# Patient Record
Sex: FEMALE | Race: BLACK OR AFRICAN AMERICAN | NOT HISPANIC OR LATINO | URBAN - METROPOLITAN AREA
[De-identification: names, ages, dates, MRNs, and addresses within clinical notes are randomized per-mention and may not be internally consistent; named-entity substitution may affect disease eponyms.]

---

## 2021-12-17 ENCOUNTER — INPATIENT (INPATIENT)
Facility: HOSPITAL | Age: 36
LOS: 2 days | Discharge: HOME | DRG: 552 | End: 2021-12-20
Attending: HOSPITALIST | Admitting: HOSPITALIST
Payer: COMMERCIAL

## 2021-12-17 VITALS
SYSTOLIC BLOOD PRESSURE: 105 MMHG | OXYGEN SATURATION: 100 % | TEMPERATURE: 98 F | HEIGHT: 63 IN | RESPIRATION RATE: 18 BRPM | DIASTOLIC BLOOD PRESSURE: 68 MMHG | WEIGHT: 171.96 LBS | HEART RATE: 75 BPM

## 2021-12-17 LAB
ALBUMIN SERPL ELPH-MCNC: 3.1 G/DL — LOW (ref 3.5–5)
ALP SERPL-CCNC: 72 U/L — SIGNIFICANT CHANGE UP (ref 40–120)
ALT FLD-CCNC: 11 U/L DA — SIGNIFICANT CHANGE UP (ref 10–60)
ANION GAP SERPL CALC-SCNC: 3 MMOL/L — LOW (ref 5–17)
APPEARANCE UR: CLEAR — SIGNIFICANT CHANGE UP
APTT BLD: 30.9 SEC — SIGNIFICANT CHANGE UP (ref 27.5–35.5)
AST SERPL-CCNC: 12 U/L — SIGNIFICANT CHANGE UP (ref 10–40)
BACTERIA # UR AUTO: ABNORMAL /HPF
BASOPHILS # BLD AUTO: 0.03 K/UL — SIGNIFICANT CHANGE UP (ref 0–0.2)
BASOPHILS NFR BLD AUTO: 0.4 % — SIGNIFICANT CHANGE UP (ref 0–2)
BILIRUB SERPL-MCNC: 0.4 MG/DL — SIGNIFICANT CHANGE UP (ref 0.2–1.2)
BILIRUB UR-MCNC: NEGATIVE — SIGNIFICANT CHANGE UP
BUN SERPL-MCNC: 8 MG/DL — SIGNIFICANT CHANGE UP (ref 7–18)
CALCIUM SERPL-MCNC: 8.7 MG/DL — SIGNIFICANT CHANGE UP (ref 8.4–10.5)
CHLORIDE SERPL-SCNC: 108 MMOL/L — SIGNIFICANT CHANGE UP (ref 96–108)
CK SERPL-CCNC: 156 U/L — SIGNIFICANT CHANGE UP (ref 21–215)
CO2 SERPL-SCNC: 29 MMOL/L — SIGNIFICANT CHANGE UP (ref 22–31)
COLOR SPEC: YELLOW — SIGNIFICANT CHANGE UP
CREAT SERPL-MCNC: 0.51 MG/DL — SIGNIFICANT CHANGE UP (ref 0.5–1.3)
DIFF PNL FLD: NEGATIVE — SIGNIFICANT CHANGE UP
EOSINOPHIL # BLD AUTO: 0.17 K/UL — SIGNIFICANT CHANGE UP (ref 0–0.5)
EOSINOPHIL NFR BLD AUTO: 2.4 % — SIGNIFICANT CHANGE UP (ref 0–6)
EPI CELLS # UR: SIGNIFICANT CHANGE UP /HPF
ERYTHROCYTE [SEDIMENTATION RATE] IN BLOOD: 28 MM/HR — HIGH (ref 0–15)
GLUCOSE SERPL-MCNC: 83 MG/DL — SIGNIFICANT CHANGE UP (ref 70–99)
GLUCOSE UR QL: NEGATIVE — SIGNIFICANT CHANGE UP
HCG SERPL-ACNC: <1 MIU/ML — SIGNIFICANT CHANGE UP
HCT VFR BLD CALC: 31.2 % — LOW (ref 34.5–45)
HGB BLD-MCNC: 10.1 G/DL — LOW (ref 11.5–15.5)
IMM GRANULOCYTES NFR BLD AUTO: 0.1 % — SIGNIFICANT CHANGE UP (ref 0–1.5)
INR BLD: 1.14 RATIO — SIGNIFICANT CHANGE UP (ref 0.88–1.16)
KETONES UR-MCNC: ABNORMAL
LEUKOCYTE ESTERASE UR-ACNC: NEGATIVE — SIGNIFICANT CHANGE UP
LYMPHOCYTES # BLD AUTO: 3.32 K/UL — HIGH (ref 1–3.3)
LYMPHOCYTES # BLD AUTO: 46.4 % — HIGH (ref 13–44)
MAGNESIUM SERPL-MCNC: 2.2 MG/DL — SIGNIFICANT CHANGE UP (ref 1.6–2.6)
MCHC RBC-ENTMCNC: 28.8 PG — SIGNIFICANT CHANGE UP (ref 27–34)
MCHC RBC-ENTMCNC: 32.4 GM/DL — SIGNIFICANT CHANGE UP (ref 32–36)
MCV RBC AUTO: 88.9 FL — SIGNIFICANT CHANGE UP (ref 80–100)
MONOCYTES # BLD AUTO: 0.43 K/UL — SIGNIFICANT CHANGE UP (ref 0–0.9)
MONOCYTES NFR BLD AUTO: 6 % — SIGNIFICANT CHANGE UP (ref 2–14)
NEUTROPHILS # BLD AUTO: 3.2 K/UL — SIGNIFICANT CHANGE UP (ref 1.8–7.4)
NEUTROPHILS NFR BLD AUTO: 44.7 % — SIGNIFICANT CHANGE UP (ref 43–77)
NITRITE UR-MCNC: NEGATIVE — SIGNIFICANT CHANGE UP
NRBC # BLD: 0 /100 WBCS — SIGNIFICANT CHANGE UP (ref 0–0)
PH UR: 6.5 — SIGNIFICANT CHANGE UP (ref 5–8)
PLATELET # BLD AUTO: 280 K/UL — SIGNIFICANT CHANGE UP (ref 150–400)
POTASSIUM SERPL-MCNC: 3.4 MMOL/L — LOW (ref 3.5–5.3)
POTASSIUM SERPL-SCNC: 3.4 MMOL/L — LOW (ref 3.5–5.3)
PROT SERPL-MCNC: 7.4 G/DL — SIGNIFICANT CHANGE UP (ref 6–8.3)
PROT UR-MCNC: 15
PROTHROM AB SERPL-ACNC: 13.5 SEC — SIGNIFICANT CHANGE UP (ref 10.6–13.6)
RBC # BLD: 3.51 M/UL — LOW (ref 3.8–5.2)
RBC # FLD: 13.6 % — SIGNIFICANT CHANGE UP (ref 10.3–14.5)
RBC CASTS # UR COMP ASSIST: SIGNIFICANT CHANGE UP /HPF (ref 0–2)
SARS-COV-2 RNA SPEC QL NAA+PROBE: SIGNIFICANT CHANGE UP
SODIUM SERPL-SCNC: 140 MMOL/L — SIGNIFICANT CHANGE UP (ref 135–145)
SP GR SPEC: 1.01 — SIGNIFICANT CHANGE UP (ref 1.01–1.02)
UROBILINOGEN FLD QL: NEGATIVE — SIGNIFICANT CHANGE UP
WBC # BLD: 7.16 K/UL — SIGNIFICANT CHANGE UP (ref 3.8–10.5)
WBC # FLD AUTO: 7.16 K/UL — SIGNIFICANT CHANGE UP (ref 3.8–10.5)
WBC UR QL: SIGNIFICANT CHANGE UP /HPF (ref 0–5)

## 2021-12-17 PROCEDURE — 99285 EMERGENCY DEPT VISIT HI MDM: CPT

## 2021-12-17 RX ORDER — MORPHINE SULFATE 50 MG/1
4 CAPSULE, EXTENDED RELEASE ORAL ONCE
Refills: 0 | Status: DISCONTINUED | OUTPATIENT
Start: 2021-12-17 | End: 2021-12-17

## 2021-12-17 RX ORDER — ONDANSETRON 8 MG/1
4 TABLET, FILM COATED ORAL ONCE
Refills: 0 | Status: COMPLETED | OUTPATIENT
Start: 2021-12-17 | End: 2021-12-17

## 2021-12-17 RX ORDER — POTASSIUM CHLORIDE 20 MEQ
40 PACKET (EA) ORAL ONCE
Refills: 0 | Status: COMPLETED | OUTPATIENT
Start: 2021-12-17 | End: 2021-12-17

## 2021-12-17 RX ORDER — KETOROLAC TROMETHAMINE 30 MG/ML
30 SYRINGE (ML) INJECTION ONCE
Refills: 0 | Status: DISCONTINUED | OUTPATIENT
Start: 2021-12-17 | End: 2021-12-17

## 2021-12-17 RX ADMIN — ONDANSETRON 4 MILLIGRAM(S): 8 TABLET, FILM COATED ORAL at 21:27

## 2021-12-17 RX ADMIN — Medication 30 MILLIGRAM(S): at 19:57

## 2021-12-17 RX ADMIN — Medication 30 MILLIGRAM(S): at 22:42

## 2021-12-17 RX ADMIN — MORPHINE SULFATE 4 MILLIGRAM(S): 50 CAPSULE, EXTENDED RELEASE ORAL at 21:00

## 2021-12-17 RX ADMIN — MORPHINE SULFATE 4 MILLIGRAM(S): 50 CAPSULE, EXTENDED RELEASE ORAL at 21:27

## 2021-12-17 NOTE — ED PROVIDER NOTE - NEUROLOGICAL, MLM
Alert and oriented, no focal deficits, no motor or sensory deficits. Alert and oriented, sensory-intact, reflex-2+

## 2021-12-17 NOTE — ED PROVIDER NOTE - MUSCULOSKELETAL, MLM
Spine appears normal, range of motion is not limited, no muscle or joint tenderness Spine appears normal, range of motion is limited, pt will not sit up, mid lower back- tenderness to palp., straight leg 15 deg kh[place]., no CVAT

## 2021-12-17 NOTE — ED ADULT NURSE NOTE - OBJECTIVE STATEMENT
Pt arrived to ED c/o worsening lower back pain, unable to stand or sit, unable to walk due to severe pain, weakness and numbness  Pt was seen and evaluated by neurologist

## 2021-12-17 NOTE — ED PROVIDER NOTE - CARE PLAN
1 Principal Discharge DX:	Intractable back pain  Secondary Diagnosis:	Unable to ambulate  Secondary Diagnosis:	Hypokalemia

## 2021-12-17 NOTE — ED PROVIDER NOTE - CLINICAL SUMMARY MEDICAL DECISION MAKING FREE TEXT BOX
Patient with a history of lower chronic back pain and now sudden onset of back pain. Patient is unable to ambulate. Will get labs and CT. Will call neuro and have them see patient. Patient with a history of lower chronic back pain and now sudden onset of back pain. Patient is unable to ambulate, concern for transverse myelitis . Will get labs and CT. Will call neuro and have them see patient.

## 2021-12-17 NOTE — ED ADULT TRIAGE NOTE - CHIEF COMPLAINT QUOTE
BIBA, chronic back pain, got worsening at this time, numbness and tingling to b/l extremities upper and lower, unable to stand at this time.

## 2021-12-17 NOTE — CONSULT NOTE ADULT - ASSESSMENT
Acute recurrent episode of (probably lumbar/ doubt thoracic) disc disease.  Could be an annulus fibrosis tear (with bulge, without extrsuion).  Could be elena herniation (extruded disc material).      No demonstrable motor or sensory deficits (proximal LE motor exam limited by pain).  No "root signature."       RECOMMENDATIONS          Acute recurrent episode of (probably lumbar/ doubt thoracic) disc disease.  Could be an annulus fibrosis tear (with bulge, without extrsuion).  Could be elena herniation (extruded disc material).      No demonstrable motor or sensory deficits (proximal LE motor exam limited by pain).  No "root signature."       RECOMMENDATIONS         Non-con MRs thoracic and lumbar spine.    Roentgenograms and CTs would not be definitive for disc imaging and would therefore subject of woman of child-bearing potential to unnecessary exposure of gonads to radiation.    Ketorolac 30mg IV Q 6 hrs PRN.  Opioid "rescue" as needed.      ESR, CRP, RF.   Acute recurrent episode of (probably lumbar/ doubt thoracic) disc disease.  Could be an annulus fibrosis tear (with bulge, without extrsuion).  Could be elena herniation (extruded disc material).      No demonstrable motor or sensory deficits (proximal LE motor exam limited by pain).  No "root signature."       RECOMMENDATIONS         Hospital admission for pain control and spine imaging.      Non-con MRs thoracic and lumbar spine.    Roentgenograms and CTs would not be definitive for disc imaging and would therefore subject of woman of child-bearing potential to unnecessary exposure of gonads to radiation.    Ketorolac 30mg IV Q 6 hrs PRN.  Opioid "rescue" as needed.      ESR, CRP, RF.

## 2021-12-17 NOTE — ED PROVIDER NOTE - PROGRESS NOTE DETAILS
seen by neuro, advised no CT.  Advised to give meds & observe.  Pt cont. to c/o back pain, unable to get up & ambulate, will admit pt

## 2021-12-17 NOTE — ED ADULT NURSE NOTE - NSIMPLEMENTINTERV_GEN_ALL_ED
Implemented All Fall Risk Interventions:  [place] to call system. Call bell, personal items and telephone within reach. Instruct patient to call for assistance. Room bathroom lighting operational. Non-slip footwear when patient is off stretcher. Physically safe environment: no spills, clutter or unnecessary equipment. Stretcher in lowest position, wheels locked, appropriate side rails in place. Provide visual cue, wrist band, yellow gown, etc. Monitor gait and stability. Monitor for mental status changes and reorient to person, place, and time. Review medications for side effects contributing to fall risk. Reinforce activity limits and safety measures with patient and family.

## 2021-12-17 NOTE — ED ADULT NURSE NOTE - CHPI ED NUR SYMPTOMS NEG
no anorexia/no bladder dysfunction/no bowel dysfunction/no constipation/no fatigue/no motor function loss/no neck tenderness

## 2021-12-17 NOTE — ED PROVIDER NOTE - WET READ LAUNCH FT
Message   Recorded as Task   Date: 11/26/2018 12:37 PM, Created By: MONICA ROLON   Task Name: Call Patient with results   Assigned To: Mandie Winston   Regarding Patient: DEBBIE CLAY, Status: Active   Comment:    MONICA ROLON - 26 Nov 2018 12:37 PM     Patient Phone: [phone]      inform of benign cervica bx. there is some suggestion of chronic inflammation which may be from infection. I woul d like her to take a one time dose of azithromycin just in case. rx is sent.   repeat pap in one year.   Indira Levy - 26 Nov 2018 2:25 PM     TASK REASSIGNED: Previously Assigned To MONICA ROLON Sara - 26 Nov 2018 4:35 PM     TASK EDITED  Spoke with patient informer her of result.  She is aware to take antibiotic due to inflammation.    She is aware to have repeat pap smear in 1 year.        Plan   1. Azithromycin 500 MG Oral Tablet; TAKE 2 TABLETS ONCE AS DIRECTED    Signatures   Electronically signed by : Mandie Winston R.N.; Nov 26 2018  4:35PM CST    
There are no Wet Read(s) to document.

## 2021-12-17 NOTE — CONSULT NOTE ADULT - SUBJECTIVE AND OBJECTIVE BOX
History from ED Provider Note    <Start of quote(s) from ED Provider Note>  "· Chief Complaint: The patient is a 36y Female complaining of back pain/injury.  · HPI Objective Statement: 36 year old female with no PHMx and no past surgeries and an LMP of 12/3/2021 presents to the ED brought in by EMS with complaints of constant mid-lower back pain, throbbing pain radiating to the back left leg with numbness and tingling to bilateral legs, arms, hands and fingers and sciatica down the left leg. Patient states she woke up fine but at 8:30 she started experiencing symptoms. States the pain has since been constant and was unable to walk. Denies taking any medications for the pain. States she had similar symptoms of back pain last month but was able to walk and symptoms resolved. States the symptoms came back today but was not ambulatory and came to the ER via EMS. Reports she has not seen a neurologist but had a MRI of the back in 2012 for back pain too. Denies fever and other injuries. Endorses having received both doses of COVID-19 vaccine.   Allergies: Tylenol, Codeine  · Presenting Symptoms: BACK PAIN, NUMBNESS, TINGLING  · Negative Findings: no fever  · Location: back  · Area: lower  mid  · Radiation: leg  · Timing: sudden onset, constant  · Duration: today  · Quality: throbbing   . . .   PAST MEDICAL/SURGICAL/FAMILY/SOCIAL HISTORY:    Past Medical, Past Surgical, and Family History:  PAST MEDICAL HISTORY:  No pertinent past medical history.     PAST SURGICAL HISTORY:  No significant past surgical history.     Tobacco Usage:  · Tobacco Usage	Never smoker   . . .        Allergies:  	codeine: Drug, Hives    Home Medications:   * Patient Currently Takes Medications as of 06-Sep-2016 08:19 documented in Structured Notes  · 	ibuprofen 800 mg oral tablet: 1 tab(s) orally 3 times a day, As Needed -for moderate pain    REVIEW OF SYSTEMS:    Review of Systems:  · MUSCULOSKELETAL: - - -  · Musculoskeletal [+]: BACK PAIN  · NEUROLOGICAL: - - -  · Neurological [+]: numbness, tingling  · ROS STATEMENT: all other ROS negative except as per HPI"  <End of quote(s) from ED Provider Note>    On my interviewing Pt I note in particular or in addition:    As best she recalls she was found to have a bulging (lumbar?) disc on MR 2012.  She sees a chiropractor who performs (stretching?) maneuvers but not spinal manipulations.  She does back exercises.  This is her third episode of back pain.  No impairment of bowel/bladder function.  No alteration of perineal sensation.      She dislocated her L shoulder ("popped out'" and remains loose) a few years ago.          EXAMINATION    [place].  Awake, alert.  In moderate distress from back pain.  Normal comprehension, expression, prosody, articulation, rate of speaking.  PERRL; confrontation visual fields intact; EOMI.  Normal facial/lingual movements.      Examined in the supine position, and turned on her L side.  Refused to turn over or to sit because of pain.      Chung of fingers and toes symmetric intact.  No UE drift.  Confrontation testing of UE motor function - normal symmetric.  Subluxable L shoulder.      Confrontation testing of distal LE muscle actions symmetric and intact (TA, gastroc, EHL, toe flexors, inversion).  She can contract more proximal muscle groups but limits her effort because of sharp pain perceived posteriorly at around the level of the upper pelvic brim +/- radiation to the L thigh.          P/LT sensation intact face, neck, four limbs, torso (front and back), buttocks.  No extinction on DSS.      Reflex                           Right    Left   Comment    Scapulohumeral               2-       2-  Pectoralis                         2        2  Biceps                             2        2  Triceps                            2        2+  Brachiorad                       2        2  Fing flex                           2       2  Delgado                     absent absent  Hip add                           2-     2-  Suprapatellar                   2       2  Infrapatellar         refused to allow positioning  Gastroc               refused to allow positioning  Plantar                         mute  mute     History from ED Provider Note    <Start of quote(s) from ED Provider Note>  "· Chief Complaint: The patient is a 36y Female complaining of back pain/injury.  · HPI Objective Statement: 36 year old female with no PHMx and no past surgeries and an LMP of 12/3/2021 presents to the ED brought in by EMS with complaints of constant mid-lower back pain, throbbing pain radiating to the back left leg with numbness and tingling to bilateral legs, arms, hands and fingers and sciatica down the left leg. Patient states she woke up fine but at 8:30 she started experiencing symptoms. States the pain has since been constant and was unable to walk. Denies taking any medications for the pain. States she had similar symptoms of back pain last month but was able to walk and symptoms resolved. States the symptoms came back today but was not ambulatory and came to the ER via EMS. Reports she has not seen a neurologist but had a MRI of the back in 2012 for back pain too. Denies fever and other injuries. Endorses having received both doses of COVID-19 vaccine.   Allergies: Tylenol, Codeine  · Presenting Symptoms: BACK PAIN, NUMBNESS, TINGLING  · Negative Findings: no fever  · Location: back  · Area: lower  mid  · Radiation: leg  · Timing: sudden onset, constant  · Duration: today  · Quality: throbbing   . . .   PAST MEDICAL/SURGICAL/FAMILY/SOCIAL HISTORY:    Past Medical, Past Surgical, and Family History:  PAST MEDICAL HISTORY:  No pertinent past medical history.     PAST SURGICAL HISTORY:  No significant past surgical history.     Tobacco Usage:  · Tobacco Usage	Never smoker   . . .        Allergies:  	codeine: Drug, Hives    Home Medications:   * Patient Currently Takes Medications as of 06-Sep-2016 08:19 documented in Structured Notes  · 	ibuprofen 800 mg oral tablet: 1 tab(s) orally 3 times a day, As Needed -for moderate pain    REVIEW OF SYSTEMS:    Review of Systems:  · MUSCULOSKELETAL: - - -  · Musculoskeletal [+]: BACK PAIN  · NEUROLOGICAL: - - -  · Neurological [+]: numbness, tingling  · ROS STATEMENT: all other ROS negative except as per HPI"  <End of quote(s) from ED Provider Note>    On my interviewing Pt I note in particular or in addition:    As best she recalls she was found to have a bulging (lumbar?) disc on MR 2012.  She sees a chiropractor who performs (stretching?) maneuvers but not spinal manipulations.  She does back exercises.  This is her third episode of back pain.  No impairment of bowel/bladder function.  No alteration of perineal sensation.      She dislocated her L shoulder ("popped out'" and remains loose) a few years ago.        No Hx of joint swelling, joint pain (fingers, hips, knees) other than for the L shoulder dislocation.        EXAMINATION    Supine on [place].  Awake, alert.  In moderate distress from back pain.  Normal comprehension, expression, prosody, articulation, rate of speaking.  PERRL; confrontation visual fields intact; EOMI.  Normal facial/lingual movements.      Examined in the supine position, and turned on her L side.  Refused to turn over or to sit because of pain.      Chung of fingers and toes symmetric intact.  No UE drift.  Confrontation testing of UE motor function - normal symmetric.  Subluxable L shoulder.      Confrontation testing of distal LE muscle actions symmetric and intact (TA, gastroc, EHL, toe flexors, inversion).  She can contract more proximal muscle groups but limits her effort because of sharp pain perceived posteriorly at around the level of the upper pelvic brim +/- radiation to the L thigh.          P/LT sensation intact face, neck, four limbs, torso (front and back), buttocks.  No extinction on DSS.      Reflex                           Right    Left   Comment    Scapulohumeral               2-       2-  Pectoralis                         2        2  Biceps                             2        2  Triceps                            2        2+  Brachiorad                       2        2  Fing flex                           2       2  Delgado                     absent absent  Hip add                           2-     2-  Suprapatellar                   2       2  Infrapatellar         refused to allow positioning  Gastroc               refused to allow positioning  Plantar                         mute  mute

## 2021-12-17 NOTE — ED PROVIDER NOTE - OBJECTIVE STATEMENT
36 year old female with no PHMx and no past surgeries and an LMP of 12/3/2021 presents to the ED brought in by EMS with complaints of constant mid-lower back pain, throbbing pain radiating to the back left leg with numbness and tingling to bilateral legs, arms, hands and fingers and sciatica down the left leg. Patient states she woke up fine but at 8:30 she started experiencing symptoms. States the pain has since been constant and was unable to walk. Denies taking any medications for the pain. States she had similar symptoms of back pain last month but was able to walk and symptoms resolved. States the symptoms came back today but was not ambulatory and came to the ER via EMS. Reports she has not seen a neurologist but had a MRI of the back in 2012 for back pain too. Denies fever and other injuries. Endorses having received both doses of COVID-19 vaccine.   Allergies: Tylenol, Codeine

## 2021-12-18 DIAGNOSIS — M54.9 DORSALGIA, UNSPECIFIED: ICD-10-CM

## 2021-12-18 DIAGNOSIS — M54.42 LUMBAGO WITH SCIATICA, LEFT SIDE: ICD-10-CM

## 2021-12-18 DIAGNOSIS — D64.9 ANEMIA, UNSPECIFIED: ICD-10-CM

## 2021-12-18 DIAGNOSIS — M54.50 LOW BACK PAIN, UNSPECIFIED: ICD-10-CM

## 2021-12-18 DIAGNOSIS — Z29.9 ENCOUNTER FOR PROPHYLACTIC MEASURES, UNSPECIFIED: ICD-10-CM

## 2021-12-18 DIAGNOSIS — M51.26 OTHER INTERVERTEBRAL DISC DISPLACEMENT, LUMBAR REGION: ICD-10-CM

## 2021-12-18 DIAGNOSIS — R26.2 DIFFICULTY IN WALKING, NOT ELSEWHERE CLASSIFIED: ICD-10-CM

## 2021-12-18 DIAGNOSIS — E87.6 HYPOKALEMIA: ICD-10-CM

## 2021-12-18 LAB
ALBUMIN SERPL ELPH-MCNC: 2.9 G/DL — LOW (ref 3.5–5)
ALP SERPL-CCNC: 68 U/L — SIGNIFICANT CHANGE UP (ref 40–120)
ALT FLD-CCNC: 10 U/L DA — SIGNIFICANT CHANGE UP (ref 10–60)
ANION GAP SERPL CALC-SCNC: 4 MMOL/L — LOW (ref 5–17)
AST SERPL-CCNC: 12 U/L — SIGNIFICANT CHANGE UP (ref 10–40)
BASOPHILS # BLD AUTO: 0.02 K/UL — SIGNIFICANT CHANGE UP (ref 0–0.2)
BASOPHILS NFR BLD AUTO: 0.4 % — SIGNIFICANT CHANGE UP (ref 0–2)
BILIRUB SERPL-MCNC: 0.2 MG/DL — SIGNIFICANT CHANGE UP (ref 0.2–1.2)
BUN SERPL-MCNC: 10 MG/DL — SIGNIFICANT CHANGE UP (ref 7–18)
CALCIUM SERPL-MCNC: 8.3 MG/DL — LOW (ref 8.4–10.5)
CHLORIDE SERPL-SCNC: 110 MMOL/L — HIGH (ref 96–108)
CO2 SERPL-SCNC: 27 MMOL/L — SIGNIFICANT CHANGE UP (ref 22–31)
CREAT SERPL-MCNC: 0.57 MG/DL — SIGNIFICANT CHANGE UP (ref 0.5–1.3)
EOSINOPHIL # BLD AUTO: 0.17 K/UL — SIGNIFICANT CHANGE UP (ref 0–0.5)
EOSINOPHIL NFR BLD AUTO: 3.4 % — SIGNIFICANT CHANGE UP (ref 0–6)
GLUCOSE SERPL-MCNC: 88 MG/DL — SIGNIFICANT CHANGE UP (ref 70–99)
HCT VFR BLD CALC: 31.1 % — LOW (ref 34.5–45)
HGB BLD-MCNC: 9.9 G/DL — LOW (ref 11.5–15.5)
IMM GRANULOCYTES NFR BLD AUTO: 0.2 % — SIGNIFICANT CHANGE UP (ref 0–1.5)
LYMPHOCYTES # BLD AUTO: 2.74 K/UL — SIGNIFICANT CHANGE UP (ref 1–3.3)
LYMPHOCYTES # BLD AUTO: 54.8 % — HIGH (ref 13–44)
MAGNESIUM SERPL-MCNC: 2.2 MG/DL — SIGNIFICANT CHANGE UP (ref 1.6–2.6)
MCHC RBC-ENTMCNC: 28.7 PG — SIGNIFICANT CHANGE UP (ref 27–34)
MCHC RBC-ENTMCNC: 31.8 GM/DL — LOW (ref 32–36)
MCV RBC AUTO: 90.1 FL — SIGNIFICANT CHANGE UP (ref 80–100)
MONOCYTES # BLD AUTO: 0.37 K/UL — SIGNIFICANT CHANGE UP (ref 0–0.9)
MONOCYTES NFR BLD AUTO: 7.4 % — SIGNIFICANT CHANGE UP (ref 2–14)
NEUTROPHILS # BLD AUTO: 1.69 K/UL — LOW (ref 1.8–7.4)
NEUTROPHILS NFR BLD AUTO: 33.8 % — LOW (ref 43–77)
NRBC # BLD: 0 /100 WBCS — SIGNIFICANT CHANGE UP (ref 0–0)
PHOSPHATE SERPL-MCNC: 2.8 MG/DL — SIGNIFICANT CHANGE UP (ref 2.5–4.5)
PLATELET # BLD AUTO: 262 K/UL — SIGNIFICANT CHANGE UP (ref 150–400)
POTASSIUM SERPL-MCNC: 3.9 MMOL/L — SIGNIFICANT CHANGE UP (ref 3.5–5.3)
POTASSIUM SERPL-SCNC: 3.9 MMOL/L — SIGNIFICANT CHANGE UP (ref 3.5–5.3)
PROT SERPL-MCNC: 7 G/DL — SIGNIFICANT CHANGE UP (ref 6–8.3)
RBC # BLD: 3.45 M/UL — LOW (ref 3.8–5.2)
RBC # BLD: 3.45 M/UL — LOW (ref 3.8–5.2)
RBC # FLD: 13.7 % — SIGNIFICANT CHANGE UP (ref 10.3–14.5)
RETICS #: 45.9 K/UL — SIGNIFICANT CHANGE UP (ref 25–125)
RETICS/RBC NFR: 1.3 % — SIGNIFICANT CHANGE UP (ref 0.5–2.5)
SODIUM SERPL-SCNC: 141 MMOL/L — SIGNIFICANT CHANGE UP (ref 135–145)
WBC # BLD: 5 K/UL — SIGNIFICANT CHANGE UP (ref 3.8–10.5)
WBC # FLD AUTO: 5 K/UL — SIGNIFICANT CHANGE UP (ref 3.8–10.5)

## 2021-12-18 PROCEDURE — 99232 SBSQ HOSP IP/OBS MODERATE 35: CPT

## 2021-12-18 PROCEDURE — 71045 X-RAY EXAM CHEST 1 VIEW: CPT | Mod: 26

## 2021-12-18 PROCEDURE — 99223 1ST HOSP IP/OBS HIGH 75: CPT

## 2021-12-18 PROCEDURE — 99222 1ST HOSP IP/OBS MODERATE 55: CPT

## 2021-12-18 RX ORDER — KETOROLAC TROMETHAMINE 30 MG/ML
30 SYRINGE (ML) INJECTION EVERY 6 HOURS
Refills: 0 | Status: DISCONTINUED | OUTPATIENT
Start: 2021-12-18 | End: 2021-12-18

## 2021-12-18 RX ORDER — SENNA PLUS 8.6 MG/1
2 TABLET ORAL AT BEDTIME
Refills: 0 | Status: DISCONTINUED | OUTPATIENT
Start: 2021-12-18 | End: 2021-12-20

## 2021-12-18 RX ORDER — SODIUM CHLORIDE 9 MG/ML
1000 INJECTION INTRAMUSCULAR; INTRAVENOUS; SUBCUTANEOUS
Refills: 0 | Status: DISCONTINUED | OUTPATIENT
Start: 2021-12-18 | End: 2021-12-20

## 2021-12-18 RX ORDER — ACETAMINOPHEN 500 MG
650 TABLET ORAL EVERY 6 HOURS
Refills: 0 | Status: DISCONTINUED | OUTPATIENT
Start: 2021-12-18 | End: 2021-12-18

## 2021-12-18 RX ORDER — KETOROLAC TROMETHAMINE 30 MG/ML
30 SYRINGE (ML) INJECTION EVERY 8 HOURS
Refills: 0 | Status: DISCONTINUED | OUTPATIENT
Start: 2021-12-18 | End: 2021-12-20

## 2021-12-18 RX ORDER — PANTOPRAZOLE SODIUM 20 MG/1
40 TABLET, DELAYED RELEASE ORAL
Refills: 0 | Status: DISCONTINUED | OUTPATIENT
Start: 2021-12-18 | End: 2021-12-20

## 2021-12-18 RX ORDER — MORPHINE SULFATE 50 MG/1
4 CAPSULE, EXTENDED RELEASE ORAL ONCE
Refills: 0 | Status: DISCONTINUED | OUTPATIENT
Start: 2021-12-18 | End: 2021-12-18

## 2021-12-18 RX ORDER — GABAPENTIN 400 MG/1
300 CAPSULE ORAL THREE TIMES A DAY
Refills: 0 | Status: DISCONTINUED | OUTPATIENT
Start: 2021-12-18 | End: 2021-12-20

## 2021-12-18 RX ORDER — ONDANSETRON 8 MG/1
4 TABLET, FILM COATED ORAL EVERY 6 HOURS
Refills: 0 | Status: DISCONTINUED | OUTPATIENT
Start: 2021-12-18 | End: 2021-12-20

## 2021-12-18 RX ORDER — ACETAMINOPHEN 500 MG
1000 TABLET ORAL EVERY 6 HOURS
Refills: 0 | Status: DISCONTINUED | OUTPATIENT
Start: 2021-12-18 | End: 2021-12-20

## 2021-12-18 RX ORDER — CYCLOBENZAPRINE HYDROCHLORIDE 10 MG/1
5 TABLET, FILM COATED ORAL THREE TIMES A DAY
Refills: 0 | Status: DISCONTINUED | OUTPATIENT
Start: 2021-12-18 | End: 2021-12-20

## 2021-12-18 RX ORDER — CYCLOBENZAPRINE HYDROCHLORIDE 10 MG/1
5 TABLET, FILM COATED ORAL THREE TIMES A DAY
Refills: 0 | Status: DISCONTINUED | OUTPATIENT
Start: 2021-12-18 | End: 2021-12-18

## 2021-12-18 RX ADMIN — MORPHINE SULFATE 4 MILLIGRAM(S): 50 CAPSULE, EXTENDED RELEASE ORAL at 01:42

## 2021-12-18 RX ADMIN — SODIUM CHLORIDE 75 MILLILITER(S): 9 INJECTION INTRAMUSCULAR; INTRAVENOUS; SUBCUTANEOUS at 17:58

## 2021-12-18 RX ADMIN — Medication 30 MILLIGRAM(S): at 21:20

## 2021-12-18 RX ADMIN — Medication 30 MILLIGRAM(S): at 05:10

## 2021-12-18 RX ADMIN — ONDANSETRON 4 MILLIGRAM(S): 8 TABLET, FILM COATED ORAL at 12:21

## 2021-12-18 RX ADMIN — MORPHINE SULFATE 4 MILLIGRAM(S): 50 CAPSULE, EXTENDED RELEASE ORAL at 09:15

## 2021-12-18 RX ADMIN — Medication 1000 MILLIGRAM(S): at 16:05

## 2021-12-18 RX ADMIN — SENNA PLUS 2 TABLET(S): 8.6 TABLET ORAL at 21:21

## 2021-12-18 RX ADMIN — CYCLOBENZAPRINE HYDROCHLORIDE 5 MILLIGRAM(S): 10 TABLET, FILM COATED ORAL at 21:21

## 2021-12-18 RX ADMIN — PANTOPRAZOLE SODIUM 40 MILLIGRAM(S): 20 TABLET, DELAYED RELEASE ORAL at 06:56

## 2021-12-18 RX ADMIN — Medication 1000 MILLIGRAM(S): at 15:05

## 2021-12-18 RX ADMIN — Medication 30 MILLIGRAM(S): at 12:25

## 2021-12-18 RX ADMIN — Medication 30 MILLIGRAM(S): at 12:40

## 2021-12-18 RX ADMIN — Medication 30 MILLIGRAM(S): at 22:17

## 2021-12-18 RX ADMIN — MORPHINE SULFATE 4 MILLIGRAM(S): 50 CAPSULE, EXTENDED RELEASE ORAL at 08:58

## 2021-12-18 RX ADMIN — Medication 40 MILLIEQUIVALENT(S): at 01:41

## 2021-12-18 RX ADMIN — GABAPENTIN 300 MILLIGRAM(S): 400 CAPSULE ORAL at 21:20

## 2021-12-18 NOTE — CONSULT NOTE ADULT - SUBJECTIVE AND OBJECTIVE BOX
Source of information: BERNARDO CHINCHILLA, Chart review  Patient language: English  : n/a    HPI:  Patient is a 37 y/o F w/ PMHx of chronic LBP due to lumbar disc herniation. She was BIBEMS from home due to inability to walk w. assoc. low back pain. Patient woke-up this morning fine and was able to do her daily chores and have follow-up with her Chiropractor. There she had heating pads, massage and stretching exercise done on her. On her way home, she had a sudden onset of severe throbbing low midback pain (10/10) that is radiating to her left thigh. There was associated brief episode of numbness of all extremities. She denies any fever, headache, blurring of vision, nausea, vomiting, chest pain, shortness of breath, palpitation, abdominal pain, diarrhea, constipation. There was also no noted paralysis nor any other neurologic deficits She had a flare-up of her back pain last month but not associated with any difficulty walking. She had a brief episode of "URI" which resolved on its own 2 months ago. She also received her 2nd dose of Pfizer COVID vaccine on Oct. 18, 2021 and claims that she had no adverse reactions, She was able to drive over his dad's house wherein she still has low back pain but had difficulty rising from her car seat or even walking. This prompted them to call EMS and she was subsequently admitted.    [facility]: FULL CODE (18 Dec 2021 00:54)    Patient is a 37 y/o F w/ PMHx of chronic LBP due to lumbar disc herniation, left sided sciatica. Pending MRI. Pain consulted for lumbar back pain. Pt seen and examined at bedside. Reports pain score  7/10 increases to 10+/10 upon movement. SCALE USED: (1-10 VNRS). Pt describes pain as constant aching low back pain, intermittently shooting to b/l lower extremities, alleviated by pain medication and rest, exacerbated by movement. Pt reports pain started 1217 after dropping off her children at school, denies change in activity, or usual routine, trauma. Reports going ot her chiropractor (which she has been seeing intermittently since being dx with lumbar bulging disc in ). Reports her pain started to flare up again 3 weeks ago and was seeing her chiropractor 3x since last month who was performing stretches and using heating pads and electrical stimulation. Reports hx GUNNAR x 2 in , none since that time. Reports lumbar back pain is a/w b/l LE numbness/ tingling. States "It feels like my legs are on fire". Pt also reports experiencing b/l upper arm numbness/tingling , which resolved spontaneously while in the ED. Of note, pt reports chest heaviness after receiving IV morphine 4mg this morning. Denies chest pain/ pressure, SOB at time of consult. Pt tolerating PO diet. Denies headaches, lethargy, chest pain, palpations, abdominal pain, nausea, vomiting, constipation, itchiness. Reports last BM . Patient stated goal for pain control: to be able to take deep breaths, get out of bed to chair and ambulate with tolerable pain control. Pt denies taking medications for pain at home.     PAST MEDICAL & SURGICAL HISTORY:  No pertinent past medical history    No significant past surgical history        FAMILY HISTORY:  No pertinent family history in first degree relatives        Social History:  lives with her 2 children  works from home  non-smoker  non-alcoholic beverage drinker  no illicit drug use (18 Dec 2021 00:54)    Allergies    codeine (Hives)    MEDICATIONS  (STANDING):  acetaminophen     Tablet .. 1000 milliGRAM(s) Oral every 6 hours  gabapentin 300 milliGRAM(s) Oral three times a day  ketorolac   Injectable 30 milliGRAM(s) IV Push every 8 hours  methylPREDNISolone 24 milliGRAM(s) Oral once  methylPREDNISolone   Oral   pantoprazole    Tablet 40 milliGRAM(s) Oral before breakfast  senna 2 Tablet(s) Oral at bedtime    MEDICATIONS  (PRN):  cyclobenzaprine 5 milliGRAM(s) Oral three times a day PRN Muscle Spasm  ondansetron Injectable 4 milliGRAM(s) IV Push every 6 hours PRN Nausea and/or Vomiting      Vital Signs Last 24 Hrs  T(C): 35.8 (18 Dec 2021 04:58), Max: 36.7 (17 Dec 2021 14:06)  T(F): 96.5 (18 Dec 2021 04:58), Max: 98.1 (17 Dec 2021 14:06)  HR: 76 (18 Dec 2021 09:55) (75 - 79)  BP: 100/54 (18 Dec 2021 09:55) (100/54 - 107/48)  BP(mean): --  RR: 17 (18 Dec 2021 09:55) (17 - 18)  SpO2: 100% (18 Dec 2021 09:55) (100% - 100%)    LABS: Reviewed.                          9.9    5.00  )-----------( 262      ( 18 Dec 2021 05:27 )             31.1     12-18    141  |  110<H>  |  10  ----------------------------<  88  3.9   |  27  |  0.57    Ca    8.3<L>      18 Dec 2021 05:27  Phos  2.8     12-18  Mg     2.2     12-18    TPro  7.0  /  Alb  2.9<L>  /  TBili  0.2  /  DBili  x   /  AST  12  /  ALT  10  /  AlkPhos  68  12-18    PT/INR - ( 17 Dec 2021 19:55 )   PT: 13.5 sec;   INR: 1.14 ratio         PTT - ( 17 Dec 2021 19:55 )  PTT:30.9 sec  LIVER FUNCTIONS - ( 18 Dec 2021 05:27 )  Alb: 2.9 g/dL / Pro: 7.0 g/dL / ALK PHOS: 68 U/L / ALT: 10 U/L DA / AST: 12 U/L / GGT: x           Urinalysis Basic - ( 17 Dec 2021 21:42 )    Color: Yellow / Appearance: Clear / S.015 / pH: x  Gluc: x / Ketone: Small  / Bili: Negative / Urobili: Negative   Blood: x / Protein: 15 / Nitrite: Negative   Leuk Esterase: Negative / RBC: 0-2 /HPF / WBC 0-2 /HPF   Sq Epi: x / Non Sq Epi: Few /HPF / Bacteria: Few /HPF      CAPILLARY BLOOD GLUCOSE        COVID-19 PCR: NotDetec (17 Dec 2021 21:42)      Radiology: Reviewed.   < from: Xray Chest 1 View-PORTABLE IMMEDIATE (Xray Chest 1 View-PORTABLE IMMEDIATE .) (21 @ 01:08) >    ACC: [id number] EXAM:  XR CHEST PORTABLE IMMED 1V                          PROCEDURE DATE:  2021          INTERPRETATION:  Lower back pain.    AP chest.    Heart and mediastinum normal.  Lungs clear.  Costophrenic angles sharp   bilaterally.    IMPRESSION: Normal chest    --- End of Report ---            SHALINI WILKINS MD; Attending Radiologist  This document has been electronically signed. Dec 18 2021 12:46PM    < end of copied text >        ORT Score -   Family Hx of substance abuse	Female	      Male  Alcohol 	                                           1                     3  Illegal drugs	                                   2                     3  Rx drugs                                           4 	                  4  Personal Hx of substance abuse		  Alcohol 	                                          3	                  3  Illegal drugs                                     4	                  4  Rx drugs                                            5 	                  5  Age between 16- 45 years	           1                     1  hx preadolescent sexual abuse	   3 	                  0  Psychological disease		  ADD, OCD, bipolar, schizophrenia   2	          2  Depression                                           1 	          1  Total: 1    a score of 3 or lower indicates low risk for opioid abuse		  a score of 4-7 indicates moderate risk for opioid abuse		  a score of 8 or higher indicates high risk for opioid abuse    REVIEW OF SYSTEMS:  CONSTITUTIONAL: No fever or fatigue  HEENT:  No difficulty hearing, no change in vision  NECK: No pain or stiffness  RESPIRATORY: No cough, wheezing, chills or hemoptysis; No shortness of breath  CARDIOVASCULAR: No chest pain, palpitations, dizziness, or leg swelling  GASTROINTESTINAL: No loss of appetite, decreased PO intake. No abdominal or epigastric pain. No nausea, vomiting; No diarrhea or constipation.   GENITOURINARY: No dysuria, frequency, hematuria, retention or incontinence  MUSCULOSKELETAL: hx CLBP. + lumbar back pain radiating to b/l LE. No swelling; + decreased lower motor strength weakness; Denies upper extremity pain, no upper motor strength weakness. no saddle anesthesia, bowel/bladder incontinence, no falls   NEURO: No headaches, + numbness/tingling b/l LE (reports b/l upper extremities numbness/ tingling resolved)   PSYCHIATRIC: No depression, anxiety or difficulty sleeping    PHYSICAL EXAM:  GENERAL:  Alert & Oriented X4, cooperative, NAD, Good concentration. Speech is clear.   RESPIRATORY: Respirations even and unlabored. Clear to auscultation bilaterally; No rales, rhonchi, wheezing, or rubs  CARDIOVASCULAR: Normal S1/S2, regular rate and rhythm; No murmurs, rubs, or gallops. No JVD.   GASTROINTESTINAL:  Soft, Nontender, Nondistended; Bowel sounds present  PERIPHERAL VASCULAR:  Extremities warm without edema. 2+ Peripheral Pulses, No cyanosis, No calf tenderness  MUSCULOSKELETAL: Pt refused assessment of back due to increased pain, + dorsiflexion and plantarflexion of b/l LE. Unable to lift b/l LE due to pain; Refused passive SLR. Motor Strength 5/5 B/L upper extremities; ROM b/l UE intact   SKIN: Warm, dry, intact. No rashes, lesions, scars or wounds.     Risk factors associated with adverse outcomes related to opioid treatment  [ ]  Concurrent benzodiazepine use  [ ]  History/ Active substance use or alcohol use disorder  [ ] Psychiatric co-morbidity  [ ] Sleep apnea  [ ] COPD  [ ] BMI> 35  [ ] Liver dysfunction  [ ] Renal dysfunction  [ ] CHF  [ ] Smoker  [ ]  Age > 60 years    [X ]  [place]  Reviewed and Copied to Chart. See below.    Plan of care and goal oriented pain management treatment options were discussed with patient and /or primary care giver; all questions and concerns were addressed and care was aligned with patient's wishes.    Educated patient on goal oriented pain management treatment options

## 2021-12-18 NOTE — CONSULT NOTE ADULT - PROBLEM SELECTOR RECOMMENDATION 9
Pt with acute lumbar back pain radiating to b/l LE, left > right which is somatic and neuropathic in nature. pt with hx chronic LBP due to lumbar disc herniation, left sided sciatica. Pending MRI.    Pt with allergy to codeine- Hives  Reports chest heaviness with IV morphine.   Will maximize non-opioid pain recommendations   - Continue Toradol 30mg IVP q 8 hours PRN severe pain  - Continue Acetaminophen 1 gram PO q 6 hours for 24 hours only. Monitor LFTs  - Gabapentin 300mg po q 8 hours. Monitor renal function.   - Flexeril 5mg PO TID x 3 days.   Bowel Regimen  - Senna 2 tablets at bedtime for constipation  Mild pain   - Non-pharmacological pain treatment recommendations  - Warm/ Cool packs PRN   - Repositioning, imagery, relaxation, distraction.  - Physical therapy OOB if no contraindications   Recommendations discussed with primary team and RN

## 2021-12-18 NOTE — H&P ADULT - ASSESSMENT
Patient is a 36 y./o F w/ PMHx of chronic low back pain due to lumbar disc herniation admitted for ambulatory dysfunction.

## 2021-12-18 NOTE — CHART NOTE - NSCHARTNOTEFT_GEN_A_CORE
Reassessed pt at bedside. Pt laying in bed, reports headache 7/10 aching, throbbing. Recently received Tylenol. Pt also continues to reports lumbar back pain with b/l LE numbness, currently tolerable while laying in bed, however reports pain increases to 10+/10 with movement and therefore does not want to move or attempt to sit up. Pt reports feeling nauseous and had an episode of vomiting earlier today therefore did not want to take flexeril or gabapentin. Reports "it was too many medications at one time". Pt received zofran at 12:20. Requesting icechips (provided to patient) and IVF (NP Thalia made aware). Pt also verbalized frustration that her MRI will most likely be on Monday.  Emotional support provided. Reeducated pt on pain regimen and indication. At the end of conversation, pt reports she is amendable to taking current pain regimen. Also provided pt with outpatient telepain information. Reassessed pt at bedside. Pt laying in bed, reports headache 7/10 aching, throbbing. Recently received Tylenol. Pt also continues to reports lumbar back pain with b/l LE numbness, currently tolerable while laying in bed, however reports pain increases to 10+/10 with movement and therefore does not want to move or attempt to sit up. Pt also reports she was not able to empty bladder and was retaining over 400ml urine and subsequently had urinary catheter placed. Denies saddle anesthesia. Pt reports feeling nauseous and had an episode of vomiting earlier today therefore did not want to take flexeril or gabapentin. Reports "it was too many medications at one time". Pt received zofran at 12:20. Requesting icechips (provided to patient) and IVF (SALVADOR Vásquez made aware). Pt also verbalized frustration that her MRI will most likely be on Monday. (Per SALVADOR Vásquez, MRI scheduling was escalated to leadership). Emotional support provided. Reeducated pt on pain regimen and indication. At the end of conversation, pt reports she is amendable to taking current pain regimen. Also provided pt with outpatient telepain information.

## 2021-12-18 NOTE — H&P ADULT - PROBLEM SELECTOR PLAN 2
Hx of chronic LBP w/ lumbar disc bulge  last attack: 10/2021 but ambulatory  ESR elevated at 28  Sees Chiropractor as outpatient  Pain meds as needed  Neuro consulted (Dr. Beal)  no need for imaging now (as per ED Provider Note)  as per patient recommended MR back  Primary Team to confirm Neuro recs  PT consulted Hx of chronic LBP w/ lumbar disc bulge  last attack: 10/2021 but ambulatory  ESR elevated at 28  Sees Chiropractor as outpatient  Pain meds as needed  Neuro consulted (Dr. Beal) - observe  no need for imaging now (as per ED Provider Note)  Hold steroids for now  as per patient recommended MR back  Primary Team to confirm Neuro recs  PT consulted

## 2021-12-18 NOTE — CONSULT NOTE ADULT - ASSESSMENT
Confidential Drug Utilization Report  Search Terms: Evelyne Dubois, 1985   Search Date: 12/18/2021 10:18:31 AM   The Drug Utilization Report below displays all of the controlled substance prescriptions, if any, that your patient has filled in the last twelve months. The information displayed on this report is compiled from pharmacy submissions to the Department, and accurately reflects the information as submitted by the pharmacies.  This report was requested by: Edelmira Prater | Reference #: [id number]   There are no results for the search terms that you entered.

## 2021-12-18 NOTE — H&P ADULT - NSHPLABSRESULTS_GEN_ALL_CORE
Complete Blood Count + Automated Diff (12.17.21 @ 19:55)   WBC Count: 7.16 K/uL   RBC Count: 3.51 M/uL   Hemoglobin: 10.1 g/dL   Hematocrit: 31.2 %   Mean Cell Volume: 88.9 fl   Mean Cell Hemoglobin: 28.8 pg   Mean Cell Hemoglobin Conc: 32.4 gm/dL   Red Cell Distrib Width: 13.6 %   Platelet Count - Automated: 280 K/uL     Comprehensive Metabolic Panel (12.17.21 @ 19:55)   Sodium, Serum: 140 mmol/L   Potassium, Serum: 3.4 mmol/L   Chloride, Serum: 108 mmol/L   Carbon Dioxide, Serum: 29 mmol/L   Anion Gap, Serum: 3 mmol/L   Blood Urea Nitrogen, Serum: 8 mg/dL   Creatinine, Serum: 0.51 mg/dL   Glucose, Serum: 83 mg/dL   Calcium, Total Serum: 8.7 mg/dL   Protein Total, Serum: 7.4 g/dL   Albumin, Serum: 3.1 g/dL   Bilirubin Total, Serum: 0.4 mg/dL   Alkaline Phosphatase, Serum: 72 U/L   Aspartate Aminotransferase (AST/SGOT): 12 U/L   Alanine Aminotransferase (ALT/SGPT): 11 U/L DA     Prothrombin Time, Plasma: 13.5 sec   Activated Partial Thromboplastin Time: 30.9    Creatine Kinase, Serum: 156 U/L   Sedimentation Rate, Erythrocyte: 28 mm/Hr     Urinalysis (12.17.21 @ 21:42)   Glucose Qualitative, Urine: Negative   Blood, Urine: Negative   pH Urine: 6.5   Color: Yellow   Urine Appearance: Clear   Bilirubin: Negative   Ketone - Urine: Small   Specific Gravity: 1.015   Protein, Urine: 15   Urobilinogen: Negative   Nitrite: Negative   Leukocyte Esterase Concentration: Negative

## 2021-12-18 NOTE — H&P ADULT - PROBLEM SELECTOR PLAN 4
Hgb on admission was 10.1  MCV - 88.9  monitor CBC Hgb on admission was 10.1  MCV - 88.9  no signs of active bleeding  monitor CBC

## 2021-12-18 NOTE — H&P ADULT - PROBLEM SELECTOR PLAN 5
RISK                                                          Points  [] Previous VTE                                           3  [] Thrombophilia                                        2  [] Lower limb paralysis                              2   [] Current Cancer                                       2   [x] Immobilization > 24 hrs                        1  [] ICU/CCU stay > 24 hours                       1  [x] Age > 60                                                   1    start Lovenox 40 mg  SQ daily RISK                                                          Points  [] Previous VTE                                           3  [] Thrombophilia                                        2  [] Lower limb paralysis                              2   [] Current Cancer                                       2   [x] Immobilization > 24 hrs                        1  [] ICU/CCU stay > 24 hours                       1  [x] Age > 60                                                   1    SCD socks for DVT prophylaxis  Protonix for GI prophylaxis

## 2021-12-18 NOTE — PATIENT PROFILE ADULT - FUNCTIONAL ASSESSMENT - DAILY ACTIVITY 3.
DOCUMENTATION CLARIFICATION FORM     Encounter #: [id number]                             Patient’s Name: Sintia Barrett  Medical Record #: [id number]                   Admit Date: 12-1-2021  CDI Specialist/: Latia                   Contact #: [phone]    Dear Dr. Pfeiffer,                  
1 = Total assistance

## 2021-12-18 NOTE — H&P ADULT - NSHPPHYSICALEXAM_GEN_ALL_CORE
Vital Signs:  · Heart Rate - 75  · BP - 105/68  · Respiration Rate - 18  · SpO2 (%) - 100    PHYSICAL EXAM:  GENERAL: NAD, speaks in full sentences, no signs of respiratory distress  HEAD:  Atraumatic, Normocephalic  EYES: EOMI, PERRLA, conjunctiva and sclera clear  NECK: Supple, No JVD  CHEST/LUNG: Clear to auscultation bilaterally; No wheeze; No crackles; No accessory muscles used  HEART: Regular rate and rhythm; No murmurs;   ABDOMEN: Soft, Nontender, Nondistended; Bowel sounds present; No guarding, No CVA tenderness  EXTREMITIES:  2+ Peripheral Pulses, No cyanosis or edema  PSYCH: AAOx3  NEUROLOGY: non-focal  MSK: (+) lower back tenderness, (+) b/l lower extremity heaviness/ stiffness on passive motion  SKIN: No rashes or lesions

## 2021-12-18 NOTE — H&P ADULT - NSHPSOCIALHISTORY_GEN_ALL_CORE
lives with her 2 children  works from home  non-smoker  non-alcoholic beverage drinker  no illicit drug use

## 2021-12-18 NOTE — H&P ADULT - ATTENDING COMMENTS
Patient is a 36 year old female with a PMH of         P/E: As above MAR    A/P:    Lumbar Back Pain with ambulatory Dysfunction:  -ESR= 28  -Patient denies any bowel/bladder incontinence  -As per ED Attending note "seen by neuro, advised no CT.  Advised to give meds & observe.  Pt cont. to c/o back pain, unable to get up & ambulate"  -As per Neuro, not likley Transverse Myelitis and/or Guillain-[place].  Therefore, will HOLD steroids  -Will continue with pain management as necessary without sedating  -Rehab Consult  -Must have Neuro continue evaluating patient for further recommendations  -Fall Precautions    Hypokalemia:  -Will continue to replete as necessary and will recheck    Normocytic Anemia:  -Can consider sending Retic Count    Hypoalbuminemia:  -Nutrition Consult    GI/DVT PPx:  -Intermittent Compression Stockings  -Pepcid Patient is a 36 year old female with a PMH of Protruding Disc (receives epidural injections) who was BIBEMS due to inability to stand.  Patient states that on the day of current presentation upon awakening she began to experience lumbar-level back pain associated with pain beginning at her toes ascending upwards to her arms/hands.      Of note, patient states that the last time she experienced back pain similar to this episode was in 2012 when she had an outpatient MRI performed which reportedly identified a "protruding disc" for which she began physical therapy as well as epidural injections.    Additionally of note, patient endorses that in October 2021 she had a viral-like syndrome which self-resolved after approximately 4 days.    At time of examination in the ED, patient endorses intermittent generalized tremulousness.  However, patient denies fever/chills, any bowel/bladder incontinence, motor/sensory loss, headache, dizziness, chest pain, palpitations, shortness of breath, abdominal pain, nausea/vomiting/diarrhea.    T(C): 36.7 (12-17-21 @ 14:06), Max: 36.7 (12-17-21 @ 14:06)  T(F): 98.1 (12-17-21 @ 14:06), Max: 98.1 (12-17-21 @ 14:06)  HR: 75 (12-17-21 @ 14:06) (75 - 75)  BP: 105/68 (12-17-21 @ 14:06) (105/68 - 105/68)  RR: 18 (12-17-21 @ 14:06) (18 - 18)  SpO2: 100% (12-17-21 @ 14:06) (100% - 100%)  Wt(kg): --    P/E: As above MAR    A/P:    Lumbar Back Pain with ambulatory Dysfunction:  -ESR= 28  -Patient denies any bowel/bladder incontinence  -As per ED Attending note "seen by neuro, advised no CT.  Advised to give meds & observe.  Pt cont. to c/o back pain, unable to get up & ambulate"  -Discussed with ED Attending, not likely Transverse Myelitis and/or Guillain-[place].  Therefore, will HOLD steroid administration at this time.  -Will continue with pain management as necessary without sedating  -Rehab Consult  -Must have Neuro continue evaluating patient for further recommendations  -Fall Precautions    Hypokalemia:  -Will continue to replete as necessary and will recheck    Normocytic Anemia:  -Can consider sending Retic Count    Hypoalbuminemia:  -Nutrition Consult    GI/DVT PPx:  -Intermittent Compression Stockings  -Pepcid Patient is a 36 year old female with a PMH of Protruding Disc (receives epidural injections) who was BIBEMS due to inability to stand.  Patient states that on the day of current presentation upon awakening she began to experience lumbar-level back pain associated with pain beginning at her toes ascending upwards to her arms/hands.  Patient endorses that the pain became increasingly severe to the point where she was unable to stand for which her  called EMS.    Of note, patient states that the last time she experienced back pain similar to this episode was in 2012 when she had an outpatient MRI performed which reportedly identified a "protruding disc" for which she began physical therapy as well as epidural injections.    Additionally of note, patient endorses that in October 2021 she had a viral-like syndrome which self-resolved after approximately 4 days.    At time of examination in the ED, patient endorses intermittent generalized tremulousness.  However, patient denies fever/chills, any bowel/bladder incontinence, motor/sensory loss, headache, dizziness, chest pain, palpitations, shortness of breath, abdominal pain, nausea/vomiting/diarrhea.    T(C): 36.7 (12-17-21 @ 14:06), Max: 36.7 (12-17-21 @ 14:06)  T(F): 98.1 (12-17-21 @ 14:06), Max: 98.1 (12-17-21 @ 14:06)  HR: 75 (12-17-21 @ 14:06) (75 - 75)  BP: 105/68 (12-17-21 @ 14:06) (105/68 - 105/68)  RR: 18 (12-17-21 @ 14:06) (18 - 18)  SpO2: 100% (12-17-21 @ 14:06) (100% - 100%)  Wt(kg): --    P/E: As above MAR    A/P:    Lumbar Back Pain with ambulatory Dysfunction:  -ESR= 28  -Patient denies any bowel/bladder incontinence  -As per ED Attending note "seen by neuro, advised no CT.  Advised to give meds & observe.  Pt cont. to c/o back pain, unable to get up & ambulate"  -Discussed with ED Attending, not likely Transverse Myelitis and/or Guillain-[place].  Therefore, will HOLD steroid administration at this time.  -Will continue with pain management as necessary without sedating  -Rehab Consult  -Must have Neuro continue evaluating patient for further recommendations  -Fall Precautions    Hypokalemia:  -Will continue to replete as necessary and will recheck    Normocytic Anemia:  -Can consider sending Retic Count    Hypoalbuminemia:  -Nutrition Consult    GI/DVT PPx:  -Intermittent Compression Stockings  -Pepcid

## 2021-12-18 NOTE — PATIENT PROFILE ADULT - FALL HARM RISK - RISK INTERVENTIONS

## 2021-12-18 NOTE — H&P ADULT - PROBLEM SELECTOR PLAN 1
p/w inability to walk or stand  Hx of LBP (lumbar disc bulge)  ESR elevated at 28  Pain meds as needed  Neuro consulted (Dr. Beal)  no need for imaging now (as per ED Provider Note)  as per patient recommended MR back  Primary Team to confirm Neuro recs  PT consulted p/w inability to walk or stand  Hx of LBP (lumbar disc bulge)  ESR elevated at 28  Pain meds as needed  Neuro consulted (Dr. Beal)- observe  no need for imaging now (as per ED Provider Note)  Hold steroids for now  as per patient recommended MR back  Primary Team to confirm Neuro recs  PT consulted p/w inability to walk or stand  Hx of LBP (lumbar disc bulge)  ESR elevated at 28  Pain meds as needed  Neuro consulted (Dr. Beal)- observe  no need for imaging now (as per ED Provider Note)  Hold steroids for now  as per patient recommended MR back  Primary Team to confirm Neuro recs  PT consulted  consider pain management consult if not controlled

## 2021-12-18 NOTE — PATIENT PROFILE ADULT - NSPROGENSOURCEINFO_GEN_A_NUR
patient
You can access the FollowMyHealth Patient Portal offered by [facility] by registering at the following website: [email or web address]. By joining [facility]’s FollowMyHealth portal, you will also be able to view your health information using other applications (apps) compatible with our system.

## 2021-12-18 NOTE — H&P ADULT - HISTORY OF PRESENT ILLNESS
Patient is a 35 y/o F w/ PMHx of chronic LBP due to lumbar disc herniation. She was BIBEMS from home due to inability to walk w. assoc. low back pain. Patient woke-up this morning fine and was able to do her daily chores and have follow-up with her Chiropractor. There she had heating pads, massage and stretching exercise done on her. On her way home, she had a sudden onset of severe throbbing low midback pain (10/10) that is radiating to her left thigh. There was associated brief episode of numbness of all extremities. She denies any fever, headache, blurring of vision, nausea, vomiting, chest pain, shortness of breath, palpitation, abdominal pain, diarrhea, constipation. There was also no noted paralysis nor any other neurologic deficits She had a flare-up of her back pain last month but not associated with any difficulty walking. She had a brief episode of "URI" which resolved on its own 2 months ago. She also received her 2nd dose of Pfizer COVID vaccine on Oct. 18, 2021 and claims that she had no adverse reactions, She was able to drive over his dad's house wherein she still has low back pain but had difficulty rising from her car seat or even walking. This prompted them to call EMS and she was subsequently admitted.    GOC: FULL CODE

## 2021-12-18 NOTE — CHART NOTE - NSCHARTNOTEFT_GEN_A_CORE
OBJECTIVE:  Vital Signs Last 24 Hrs  T(C): 36.3 (18 Dec 2021 13:35), Max: 36.3 (18 Dec 2021 13:35)  T(F): 97.3 (18 Dec 2021 13:35), Max: 97.3 (18 Dec 2021 13:35)  HR: 85 (18 Dec 2021 13:35) (76 - 85)  BP: 103/53 (18 Dec 2021 13:35) (100/54 - 107/48)  BP(mean): --  RR: 17 (18 Dec 2021 13:35) (17 - 17)  SpO2: 99% (18 Dec 2021 13:35) (99% - 100%)    FOCUSED PHYSICAL EXAM: NAD, S1S2, regular, lungs clear bilat, abd soft, +PPP bilat, minimal movement on BLE, moderate strength BLE, + sensation, denies N/T    LABS:                        9.9    5.00  )-----------( 262      ( 18 Dec 2021 05:27 )             31.1   CARDIAC MARKERS ( 17 Dec 2021 19:55 )  x     / x     / 156 U/L / x     / x        12-18    141  |  110<H>  |  10  ----------------------------<  88  3.9   |  27  |  0.57    Ca    8.3<L>      18 Dec 2021 05:27  Phos  2.8     12-18  Mg     2.2     12-18    TPro  7.0  /  Alb  2.9<L>  /  TBili  0.2  /  DBili  x   /  AST  12  /  ALT  10  /  AlkPhos  68  12-18    HPI:  Patient is a 36 y./o F w/ PMHx of chronic low back pain due to lumbar disc herniation admitted for ambulatory dysfunction. Pending MRI spine. Neuro Dr James following    A/P:  Lumbar Back Pain with ambulatory Dysfunction:  -Patient denies any bowel/bladder incontinence  - Pending MRI spine  - C/w gabapentin flexeril, and Toradol  - Pain mange ment following  - Neuro Dr James    Urinary retention:  - pt stated that she is holding her urine due to fear of back pain  - 's  - austin inserted  - TOV when pain get better    GI/DVT PPx:  -Intermittent Compression Stockings  -Pepcid.

## 2021-12-19 ENCOUNTER — TRANSCRIPTION ENCOUNTER (OUTPATIENT)
Age: 36
End: 2021-12-19

## 2021-12-19 LAB
MRSA PCR RESULT.: SIGNIFICANT CHANGE UP
S AUREUS DNA NOSE QL NAA+PROBE: SIGNIFICANT CHANGE UP

## 2021-12-19 PROCEDURE — 99233 SBSQ HOSP IP/OBS HIGH 50: CPT

## 2021-12-19 RX ORDER — LIDOCAINE 4 G/100G
1 CREAM TOPICAL DAILY
Refills: 0 | Status: DISCONTINUED | OUTPATIENT
Start: 2021-12-19 | End: 2021-12-20

## 2021-12-19 RX ORDER — POLYETHYLENE GLYCOL 3350 17 G/17G
17 POWDER, FOR SOLUTION ORAL DAILY
Refills: 0 | Status: DISCONTINUED | OUTPATIENT
Start: 2021-12-19 | End: 2021-12-20

## 2021-12-19 RX ADMIN — CYCLOBENZAPRINE HYDROCHLORIDE 5 MILLIGRAM(S): 10 TABLET, FILM COATED ORAL at 09:31

## 2021-12-19 RX ADMIN — LIDOCAINE 1 PATCH: 4 CREAM TOPICAL at 21:06

## 2021-12-19 RX ADMIN — GABAPENTIN 300 MILLIGRAM(S): 400 CAPSULE ORAL at 09:31

## 2021-12-19 RX ADMIN — Medication 30 MILLIGRAM(S): at 22:03

## 2021-12-19 RX ADMIN — SENNA PLUS 2 TABLET(S): 8.6 TABLET ORAL at 22:10

## 2021-12-19 RX ADMIN — Medication 30 MILLIGRAM(S): at 13:15

## 2021-12-19 RX ADMIN — GABAPENTIN 300 MILLIGRAM(S): 400 CAPSULE ORAL at 22:10

## 2021-12-19 RX ADMIN — CYCLOBENZAPRINE HYDROCHLORIDE 5 MILLIGRAM(S): 10 TABLET, FILM COATED ORAL at 22:10

## 2021-12-19 RX ADMIN — Medication 30 MILLIGRAM(S): at 12:58

## 2021-12-19 RX ADMIN — POLYETHYLENE GLYCOL 3350 17 GRAM(S): 17 POWDER, FOR SOLUTION ORAL at 11:02

## 2021-12-19 RX ADMIN — Medication 30 MILLIGRAM(S): at 05:42

## 2021-12-19 RX ADMIN — Medication 30 MILLIGRAM(S): at 04:41

## 2021-12-19 RX ADMIN — LIDOCAINE 1 PATCH: 4 CREAM TOPICAL at 16:51

## 2021-12-19 RX ADMIN — Medication 30 MILLIGRAM(S): at 05:44

## 2021-12-19 NOTE — CHART NOTE - NSCHARTNOTEFT_GEN_A_CORE
EVENT:   12/19/21, Called by RN  re: patient request to remove her indwelling urethral catheter. Pt. was seen in bedside. Alert and oriented x3. Valle catheter was inserted -12/18 for urine retention. Intact, urine - clean. Pt. stated she felt discomfort in lower abdomen and strongly requested to discontinue catheter. She'll attempt to void without catheter.    HPI:    37 y/o female w/ PMH of chronic LBP due to lumbar disc herniation. She was BIBEMS from home due to inability to walk w. assoc. low back pain. Patient woke-up this morning fine and was able to do her daily chores and have follow-up with her Chiropractor. There she had heating pads, massage and stretching exercise done on her. On her way home, she had a sudden onset of severe throbbing low midback pain (10/10) that is radiating to her left thigh. There was associated brief episode of numbness of all extremities. She denies any fever, headache, blurring of vision, nausea, vomiting, chest pain, shortness of breath, palpitation, abdominal pain, diarrhea, constipation. There was also no noted paralysis nor any other neurologic deficits She had a flare-up of her back pain last month but not associated with any difficulty walking. She had a brief episode of "URI" which resolved on its own 2 months ago. She also received her 2nd dose of Pfizer COVID19 vaccine on Oct. 18, 2021 and claims that she had no adverse reactions, She was able to drive over his dad's house wherein she still has low back pain but had difficulty rising from her car seat or even walking. This prompted them to call EMS and she was subsequently admitted.    OBJECTIVE:  Vital Signs Last 24 Hrs  T(C): 36.9 (19 Dec 2021 21:13), Max: 36.9 (19 Dec 2021 21:13)  T(F): 98.5 (19 Dec 2021 21:13), Max: 98.5 (19 Dec 2021 21:13)  HR: 85 (19 Dec 2021 21:13) (73 - 92)  BP: 102/61 (19 Dec 2021 21:13) (102/56 - 104/53)  BP(mean): --  RR: 16 (19 Dec 2021 21:13) (16 - 18)  SpO2: 99% (19 Dec 2021 21:13) (99% - 99%)    FOCUSED PHYSICAL EXAM:    LABS:                        9.9    5.00  )-----------( 262      ( 18 Dec 2021 05:27 )             31.1     12-18    141  |  110<H>  |  10  ----------------------------<  88  3.9   |  27  |  0.57    Ca    8.3<L>      18 Dec 2021 05:27  Phos  2.8     12-18  Mg     2.2     12-18    TPro  7.0  /  Alb  2.9<L>  /  TBili  0.2  /  DBili  x   /  AST  12  /  ALT  10  /  AlkPhos  68  12-18    PLAN:   - Discontinue urethral catheter (pt. request).    FOLLOW UP / RESULT:   - Monitor patient urine output,   - Continue supportive care.

## 2021-12-19 NOTE — DISCHARGE NOTE PROVIDER - NS AS DC PROVIDER CONTACT Y/N MULTI
Price (Do Not Change): 0.00 Detail Level: Simple Instructions: This plan will send the code FBSE to the PM system.  DO NOT or CHANGE the price. Yes

## 2021-12-19 NOTE — DISCHARGE NOTE PROVIDER - NSDCCPCAREPLAN_GEN_ALL_CORE_FT
PRINCIPAL DISCHARGE DIAGNOSIS  Diagnosis: Intractable back pain  Assessment and Plan of Treatment: You came in with intractable back pain which started on the day of admission after a chiropractic visit. You had 10/10 lower back pain extending to LLE . Your pain was excruciatiung to a point you were unable to walk , called EMS. In the ER, ESR noted to be elevated ( 28), Dr Beal from Neurology was consulted. MRI Lower back revealed.....  You were evaluated by pain management team and you had relief with Gabapentin and Flexeril      SECONDARY DISCHARGE DIAGNOSES  Diagnosis: Unable to ambulate  Assessment and Plan of Treatment:     Diagnosis: Hypokalemia  Assessment and Plan of Treatment:      PRINCIPAL DISCHARGE DIAGNOSIS  Diagnosis: Intractable back pain  Assessment and Plan of Treatment: You came in with intractable back pain which started on the day of admission after a chiropractic visit. You had 10/10 lower back pain extending to LLE . Neurology consulted, MRI lumbar and thoracic spine resulted Mild disc degeneration at L5-S1 with loss of signal within the nucleus pulposus. Small central disc herniation at this level indents the ventral thecal sac.  Pain management consulted, started lidocaine patch, Flexeril, Gabapentin and Toradol IV.  PT evaluated and recommending outpatient PT. continue current pain medications, follow up with outpatient PT.      SECONDARY DISCHARGE DIAGNOSES  Diagnosis: Lumbar disc herniation  Assessment and Plan of Treatment: Admitted for ambulatory dysfunction, low back pain likely due to disc herniation. Neurology consulted, MRI lumbar and thoracic spine resulted Mild disc degeneration at L5-S1 with loss of signal within the nucleus pulposus. Small central disc herniation at this level indents the ventral thecal sac.  Pain management consulted, started lidocaine patch, Flexeril, Gabapentin and Toradol IV.  PT evaluated and recommending outpatient PT. continue current pain medications, follow up with outpatient PT.    Diagnosis: Anemia  Assessment and Plan of Treatment: Symptoms to report, bleeding, palpitations, fatigue, pale skin, cold skin, dizziness. Take medications as ordered by PCP      Diagnosis: Hypokalemia  Assessment and Plan of Treatment: treated fro mild low serum potassium, repeat blood test resulted normal range. Eat healthy with potassium josué diet. Follow up with your PMD regularly after discharge.     PRINCIPAL DISCHARGE DIAGNOSIS  Diagnosis: Intractable back pain  Assessment and Plan of Treatment: You came in with intractable back pain which started on the day of admission after a chiropractic visit. You had 10/10 lower back pain extending to LLE . Neurology consulted, MRI lumbar and thoracic spine resulted Mild disc degeneration at L5-S1 with loss of signal within the nucleus pulposus. Small central disc herniation at this level indents the ventral thecal sac.  Pain management consulted, started lidocaine patch, Flexeril, Gabapentin and Toradol IV.  PT evaluated and recommending outpatient PT. continue current pain medications, steroid as recommended by pain management. follow up with outpatient PT.      SECONDARY DISCHARGE DIAGNOSES  Diagnosis: Lumbar disc herniation  Assessment and Plan of Treatment: Admitted for ambulatory dysfunction, low back pain likely due to disc herniation. Neurology consulted, MRI lumbar and thoracic spine resulted Mild disc degeneration at L5-S1 with loss of signal within the nucleus pulposus. Small central disc herniation at this level indents the ventral thecal sac.  Pain management consulted, started lidocaine patch, Flexeril, Gabapentin and Toradol IV.  PT evaluated and recommending outpatient PT. continue current pain medications, follow up with outpatient PT.    Diagnosis: Anemia  Assessment and Plan of Treatment: Symptoms to report, bleeding, palpitations, fatigue, pale skin, cold skin, dizziness. Take medications as ordered by PCP      Diagnosis: Hypokalemia  Assessment and Plan of Treatment: treated fro mild low serum potassium, repeat blood test resulted normal range. Eat healthy with potassium josué diet. Follow up with your PMD regularly after discharge.

## 2021-12-19 NOTE — DISCHARGE NOTE PROVIDER - NSDCPNSUBOBJ_GEN_ALL_CORE
austin discontinued- passed tov- also had a BM  MRI done- mild L5-S1 disc herniation. No rupture. report given to the patient   pain controlled. out patient pain mx follow up  out- patient PT  patient wants to go home. stable to dc home

## 2021-12-19 NOTE — PROGRESS NOTE ADULT - PROBLEM SELECTOR PLAN 1
possible 2/2 to acute exacerbation of Lumbar disc herniation  -pain Mx consult appreciated- c/w Ketorolac, Neurontin and Flexeril  - MRI pending- likely will happen tomorrow  c/w Valle - TOV tomorrow  PT/OT tommorrow possible 2/2 to acute exacerbation of Lumbar disc herniation  -pain Mx consult appreciated- c/w Ketorolac, Neurontin and Flexeril  -add Lidocaine patch to back  - MRI pending- likely will happen tomorrow  c/w Valle - TOV tomorrow  PT/OT tommorrow

## 2021-12-19 NOTE — PROGRESS NOTE ADULT - PROBLEM SELECTOR PLAN 4
RISK                                                          Points  [] Previous VTE                                           3  [] Thrombophilia                                        2  [] Lower limb paralysis                              2   [] Current Cancer                                       2   [x] Immobilization > 24 hrs                        1  [] ICU/CCU stay > 24 hours                       1  [x] Age > 60                                                   1    SCD socks for DVT prophylaxis  Protonix for GI prophylaxis

## 2021-12-19 NOTE — DISCHARGE NOTE PROVIDER - NSDCMRMEDTOKEN_GEN_ALL_CORE_FT
ibuprofen 800 mg oral tablet: 1 tab(s) orally 3 times a day, As Needed -for moderate pain   acetaminophen 500 mg oral tablet: 2 tab(s) orally every 8 hours, As Needed -Moderate Pain (4 - 6) - for moderate pain   cyclobenzaprine 10 mg oral tablet: 1 tab(s) orally 3 times a day  dexamethasone 4 mg oral tablet: 1 tab(s) orally every 8 hours   gabapentin 300 mg oral capsule: 1 cap(s) orally 3 times a day  lidocaine 4% topical film: Apply topically to affected area once a day   for back pain  senna oral tablet: 2 tab(s) orally once a day (at bedtime)   acetaminophen 500 mg oral tablet: 2 tab(s) orally every 8 hours, As Needed -Moderate Pain (4 - 6) - for moderate pain   cyclobenzaprine 10 mg oral tablet: 1 tab(s) orally 3 times a day  dexamethasone 4 mg oral tablet: 1 tab(s) orally every 8 hours   gabapentin 300 mg oral capsule: 1 cap(s) orally 3 times a day  lidocaine 4% topical film: Apply topically to affected area once a day   for back pain  naproxen 375 mg oral tablet: 1 tab(s) orally every 8 hours   pantoprazole 40 mg oral delayed release tablet: 1 tab(s) orally once a day (before a meal)  polyethylene glycol 3350 oral powder for reconstitution: 17 gram(s) orally once a day  senna oral tablet: 2 tab(s) orally once a day (at bedtime)

## 2021-12-19 NOTE — DISCHARGE NOTE PROVIDER - PROVIDER TOKENS
FREE:[LAST:[PMD],PHONE:[(   )    -],FAX:[(   )    -],ADDRESS:[Follow up with own PMD],FOLLOWUP:[Routine]]

## 2021-12-19 NOTE — PROGRESS NOTE ADULT - TIME BILLING
D/w patient in detail her s/s, disease progress, medications. explained about MRI process, escalation. Answered her questions regarding constipation. Agreed for lidocaine patch.

## 2021-12-19 NOTE — DISCHARGE NOTE PROVIDER - HOSPITAL COURSE
DATE OF OPERATION:  07/14/2017

 

PREOPERATIVE DIAGNOSIS:  Left renal calculus.

 

POSTOPERATIVE DIAGNOSIS:  left renal calculus.

 

PROCEDURE:  Cystoscopy, left ureteropyeloscopic laser lithotripsy, and left double-J

stent insertion.

 

SURGEON:  Quan Dacosta MD

 

ASSISTANT:  None.

 

ANESTHESIA:  General via laryngeal mask.

 

ANESTHESIOLOGIST:  Veronica Elizondo MD

 

SPECIMENS:  None.

 

CULTURES:  None.

 

DRAINS:  A 6-Azerbaijani 24-cm left double-J stent.

 

ESTIMATED BLOOD LOSS:  None.

 

COMPLICATION:  None.

 

DESCRIPTION OF PROCEDURE:  The patient was brought into the operating room, placed on

the operating table in supine position.  After administration of general anesthesia,

intravenous antibiotics were administered, and the perineum and vagina were prepped

and draped in usual sterile manner.  The 22-Azerbaijani cystoscope was inserted into the

bladder with the obturator in place.  The obturator was removed, and urine was

evacuated.  The 30-degree telescope was inserted, and cystoscopy was performed.  Both

ureteral orifices were in their usual location with clear efflux bilaterally.  There

were no foreign bodies, tumors, stones, or inflammation.  The left ureteral orifice

was cannulated with a 0.038 guidewire which was advanced to the level of the left

renal pelvis under direct visual and fluoroscopic guidance.  Now, cystoscope was

removed.  The dual-lumen catheter was inserted, and a retrograde pyelogram was done

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

approximately 2 cm which was radiopaque.  The second guidewire was placed.  The

dual-lumen catheter was removed, and the Navigator ureteral access sheath was placed

to the level of the ureteropelvic junction under fluoroscopic guidance.  The inner

sheath and the second wire were removed, and now, the flexible ureteroscope was

inserted through the ureteral access sheath to the level of the renal pelvis.  Now,

the upper pole stone was visualized.  Using the 200-micron laser fiber, laser

lithotripsy was done, and almost the entire stone was fragmented into dust.  A small,

approximately 7-mm fragment was remaining in the middle calyx which could not be

reached and will be treated with lithotripsy at a later date.  Now, retrograde

pyelogram was done, demonstrated small residual stone fragment and small amount of

extravasation of contrast.  The 6-Azerbaijani 24-cm left double-J stent was now inserted

over the guidewire under fluoroscopic guidance, leaving 1 coil in the renal pelvis

and 1 coil in the bladder.  The stent was secured to the thigh with its suture and a

Tegaderm.  She tolerated the procedure well, was awoken from anesthesia in the

operating room, transferred to the recovery room in stable condition.

 

PLAN:  Followup in the office next week for stent removal, followed by extracorporeal

shock wave lithotripsy of the remaining fragment in the left kidney.

 

 

FLEX [id number]

DD: 07/14/2017 10:54

DT: 07/17/2017 07:46

Job #:  [id number] Patient is a 37 y/o F w/ PMHx of chronic LBP due to lumbar disc herniation. She was BIBEMS from home due to inability to walk w. assoc. low back pain. Patient woke-up this morning fine and was able to do her daily chores and have follow-up with her Chiropractor. There she had heating pads, massage and stretching exercise done on her. On her way home, she had a sudden onset of severe throbbing low midback pain (10/10) that is radiating to her left thigh. There was associated brief episode of numbness of all extremities. She denies any fever, headache, blurring of vision, nausea, vomiting, chest pain, shortness of breath, palpitation, abdominal pain, diarrhea, constipation. There was also no noted paralysis nor any other neurologic deficits She had a flare-up of her back pain last month but not associated with any difficulty walking.  She was able to drive over his dad's house wherein she still has low back pain but had difficulty rising from her car seat or even walking. This prompted them to call EMS and she was subsequently admitted.  In the ER , ESR noted to be elevated (28). Dr Beal from Neurology consulted, Awaiting LS MRI, No steroids for now. Pain managemnt consulted, patient got pain relief with Gabapentin and muscle relaxants. Patient unable to void while in the bed , and has fear to walk to rest room prompting to place a [place] on 12/19/2021.      INCOMPLETE   Patient is a 35 y/o F w/ PMHx of chronic LBP due to lumbar disc herniation. She was BIBEMS from home due to inability to walk due to low back pain, with sudden onset of severe throbbing low midback pain (10/10) that is radiating to her left thigh. There was associated brief episode of numbness of all extremities. She denies any fever, headache, blurring of vision, nausea, vomiting, chest pain, shortness of breath, palpitation, abdominal pain, diarrhea, constipation. There was also no noted paralysis nor any other neurologic deficits She had a flare-up of her back pain last month but not associated with any difficulty walking at that time.  Admitted for intractable back pain requiring further work up. In the ER , ESR noted to be elevated (28). Dr Beal from Neurology consulted, MRI lumbar and thoracic spine resulted Mild disc degeneration at L5-S1 with loss of signal within the nucleus pulposus. Small central disc herniation at this level indents the ventral thecal sac.  Pain management consulted, started lidocaine patch, Flexeril, Gabapentin and Toradol IV.  PT evaluated and recommending outpatient PT.   Patient medically optimized for discharge home with outpatient PT and pain clinic.   Please refer to medical records/progress notes for in depth hospital course.

## 2021-12-20 ENCOUNTER — TRANSCRIPTION ENCOUNTER (OUTPATIENT)
Age: 36
End: 2021-12-20

## 2021-12-20 VITALS
TEMPERATURE: 98 F | RESPIRATION RATE: 16 BRPM | SYSTOLIC BLOOD PRESSURE: 102 MMHG | HEART RATE: 84 BPM | OXYGEN SATURATION: 100 % | DIASTOLIC BLOOD PRESSURE: 46 MMHG

## 2021-12-20 LAB
ALBUMIN SERPL ELPH-MCNC: 2.6 G/DL — LOW (ref 3.5–5)
ALP SERPL-CCNC: 61 U/L — SIGNIFICANT CHANGE UP (ref 40–120)
ALT FLD-CCNC: 14 U/L DA — SIGNIFICANT CHANGE UP (ref 10–60)
ANION GAP SERPL CALC-SCNC: 3 MMOL/L — LOW (ref 5–17)
AST SERPL-CCNC: 12 U/L — SIGNIFICANT CHANGE UP (ref 10–40)
BASOPHILS # BLD AUTO: 0.02 K/UL — SIGNIFICANT CHANGE UP (ref 0–0.2)
BASOPHILS NFR BLD AUTO: 0.3 % — SIGNIFICANT CHANGE UP (ref 0–2)
BILIRUB SERPL-MCNC: 0.1 MG/DL — LOW (ref 0.2–1.2)
BUN SERPL-MCNC: 15 MG/DL — SIGNIFICANT CHANGE UP (ref 7–18)
CALCIUM SERPL-MCNC: 8.6 MG/DL — SIGNIFICANT CHANGE UP (ref 8.4–10.5)
CHLORIDE SERPL-SCNC: 107 MMOL/L — SIGNIFICANT CHANGE UP (ref 96–108)
CO2 SERPL-SCNC: 28 MMOL/L — SIGNIFICANT CHANGE UP (ref 22–31)
CREAT SERPL-MCNC: 0.57 MG/DL — SIGNIFICANT CHANGE UP (ref 0.5–1.3)
CULTURE RESULTS: SIGNIFICANT CHANGE UP
EOSINOPHIL # BLD AUTO: 0.16 K/UL — SIGNIFICANT CHANGE UP (ref 0–0.5)
EOSINOPHIL NFR BLD AUTO: 2.4 % — SIGNIFICANT CHANGE UP (ref 0–6)
GLUCOSE SERPL-MCNC: 91 MG/DL — SIGNIFICANT CHANGE UP (ref 70–99)
HCT VFR BLD CALC: 31.6 % — LOW (ref 34.5–45)
HGB BLD-MCNC: 10 G/DL — LOW (ref 11.5–15.5)
IMM GRANULOCYTES NFR BLD AUTO: 0.3 % — SIGNIFICANT CHANGE UP (ref 0–1.5)
LYMPHOCYTES # BLD AUTO: 2.3 K/UL — SIGNIFICANT CHANGE UP (ref 1–3.3)
LYMPHOCYTES # BLD AUTO: 34.8 % — SIGNIFICANT CHANGE UP (ref 13–44)
MAGNESIUM SERPL-MCNC: 2.2 MG/DL — SIGNIFICANT CHANGE UP (ref 1.6–2.6)
MCHC RBC-ENTMCNC: 28.5 PG — SIGNIFICANT CHANGE UP (ref 27–34)
MCHC RBC-ENTMCNC: 31.6 GM/DL — LOW (ref 32–36)
MCV RBC AUTO: 90 FL — SIGNIFICANT CHANGE UP (ref 80–100)
MONOCYTES # BLD AUTO: 0.5 K/UL — SIGNIFICANT CHANGE UP (ref 0–0.9)
MONOCYTES NFR BLD AUTO: 7.6 % — SIGNIFICANT CHANGE UP (ref 2–14)
NEUTROPHILS # BLD AUTO: 3.61 K/UL — SIGNIFICANT CHANGE UP (ref 1.8–7.4)
NEUTROPHILS NFR BLD AUTO: 54.6 % — SIGNIFICANT CHANGE UP (ref 43–77)
NRBC # BLD: 0 /100 WBCS — SIGNIFICANT CHANGE UP (ref 0–0)
PHOSPHATE SERPL-MCNC: 3.2 MG/DL — SIGNIFICANT CHANGE UP (ref 2.5–4.5)
PLATELET # BLD AUTO: 272 K/UL — SIGNIFICANT CHANGE UP (ref 150–400)
POTASSIUM SERPL-MCNC: 3.9 MMOL/L — SIGNIFICANT CHANGE UP (ref 3.5–5.3)
POTASSIUM SERPL-SCNC: 3.9 MMOL/L — SIGNIFICANT CHANGE UP (ref 3.5–5.3)
PROT SERPL-MCNC: 6.7 G/DL — SIGNIFICANT CHANGE UP (ref 6–8.3)
RBC # BLD: 3.51 M/UL — LOW (ref 3.8–5.2)
RBC # FLD: 13.3 % — SIGNIFICANT CHANGE UP (ref 10.3–14.5)
SODIUM SERPL-SCNC: 138 MMOL/L — SIGNIFICANT CHANGE UP (ref 135–145)
SPECIMEN SOURCE: SIGNIFICANT CHANGE UP
WBC # BLD: 6.61 K/UL — SIGNIFICANT CHANGE UP (ref 3.8–10.5)
WBC # FLD AUTO: 6.61 K/UL — SIGNIFICANT CHANGE UP (ref 3.8–10.5)

## 2021-12-20 PROCEDURE — 85045 AUTOMATED RETICULOCYTE COUNT: CPT

## 2021-12-20 PROCEDURE — 72148 MRI LUMBAR SPINE W/O DYE: CPT

## 2021-12-20 PROCEDURE — 99239 HOSP IP/OBS DSCHRG MGMT >30: CPT

## 2021-12-20 PROCEDURE — 36415 COLL VENOUS BLD VENIPUNCTURE: CPT

## 2021-12-20 PROCEDURE — 82550 ASSAY OF CK (CPK): CPT

## 2021-12-20 PROCEDURE — 72148 MRI LUMBAR SPINE W/O DYE: CPT | Mod: 26

## 2021-12-20 PROCEDURE — 85025 COMPLETE CBC W/AUTO DIFF WBC: CPT

## 2021-12-20 PROCEDURE — 85730 THROMBOPLASTIN TIME PARTIAL: CPT

## 2021-12-20 PROCEDURE — 99231 SBSQ HOSP IP/OBS SF/LOW 25: CPT

## 2021-12-20 PROCEDURE — 99285 EMERGENCY DEPT VISIT HI MDM: CPT

## 2021-12-20 PROCEDURE — 85610 PROTHROMBIN TIME: CPT

## 2021-12-20 PROCEDURE — 80053 COMPREHEN METABOLIC PANEL: CPT

## 2021-12-20 PROCEDURE — 99233 SBSQ HOSP IP/OBS HIGH 50: CPT

## 2021-12-20 PROCEDURE — 87635 SARS-COV-2 COVID-19 AMP PRB: CPT

## 2021-12-20 PROCEDURE — 71045 X-RAY EXAM CHEST 1 VIEW: CPT

## 2021-12-20 PROCEDURE — 93005 ELECTROCARDIOGRAM TRACING: CPT

## 2021-12-20 PROCEDURE — 72146 MRI CHEST SPINE W/O DYE: CPT | Mod: 26

## 2021-12-20 PROCEDURE — 87086 URINE CULTURE/COLONY COUNT: CPT

## 2021-12-20 PROCEDURE — 81001 URINALYSIS AUTO W/SCOPE: CPT

## 2021-12-20 PROCEDURE — 83735 ASSAY OF MAGNESIUM: CPT

## 2021-12-20 PROCEDURE — 84702 CHORIONIC GONADOTROPIN TEST: CPT

## 2021-12-20 PROCEDURE — 87640 STAPH A DNA AMP PROBE: CPT

## 2021-12-20 PROCEDURE — 87641 MR-STAPH DNA AMP PROBE: CPT

## 2021-12-20 PROCEDURE — 84100 ASSAY OF PHOSPHORUS: CPT

## 2021-12-20 PROCEDURE — 85652 RBC SED RATE AUTOMATED: CPT

## 2021-12-20 PROCEDURE — 72146 MRI CHEST SPINE W/O DYE: CPT

## 2021-12-20 RX ORDER — GABAPENTIN 400 MG/1
1 CAPSULE ORAL
Qty: 21 | Refills: 0
Start: 2021-12-20 | End: 2021-12-26

## 2021-12-20 RX ORDER — MAGNESIUM HYDROXIDE 400 MG/1
30 TABLET, CHEWABLE ORAL DAILY
Refills: 0 | Status: DISCONTINUED | OUTPATIENT
Start: 2021-12-20 | End: 2021-12-20

## 2021-12-20 RX ORDER — ACETAMINOPHEN 500 MG
2 TABLET ORAL
Qty: 30 | Refills: 0
Start: 2021-12-20 | End: 2021-12-24

## 2021-12-20 RX ORDER — SENNA PLUS 8.6 MG/1
2 TABLET ORAL
Qty: 60 | Refills: 0
Start: 2021-12-20 | End: 2022-01-18

## 2021-12-20 RX ORDER — POLYETHYLENE GLYCOL 3350 17 G/17G
17 POWDER, FOR SOLUTION ORAL
Qty: 85 | Refills: 0
Start: 2021-12-20 | End: 2021-12-24

## 2021-12-20 RX ORDER — CYCLOBENZAPRINE HYDROCHLORIDE 10 MG/1
1 TABLET, FILM COATED ORAL
Qty: 21 | Refills: 0
Start: 2021-12-20 | End: 2021-12-26

## 2021-12-20 RX ORDER — ACETAMINOPHEN 500 MG
1000 TABLET ORAL EVERY 8 HOURS
Refills: 0 | Status: DISCONTINUED | OUTPATIENT
Start: 2021-12-20 | End: 2021-12-20

## 2021-12-20 RX ORDER — DEXAMETHASONE 0.5 MG/5ML
1 ELIXIR ORAL
Qty: 9 | Refills: 0
Start: 2021-12-20 | End: 2021-12-22

## 2021-12-20 RX ORDER — CYCLOBENZAPRINE HYDROCHLORIDE 10 MG/1
10 TABLET, FILM COATED ORAL THREE TIMES A DAY
Refills: 0 | Status: DISCONTINUED | OUTPATIENT
Start: 2021-12-20 | End: 2021-12-20

## 2021-12-20 RX ORDER — LIDOCAINE 4 G/100G
1 CREAM TOPICAL
Qty: 30 | Refills: 0
Start: 2021-12-20 | End: 2022-01-18

## 2021-12-20 RX ORDER — PANTOPRAZOLE SODIUM 20 MG/1
1 TABLET, DELAYED RELEASE ORAL
Qty: 4 | Refills: 0
Start: 2021-12-20 | End: 2021-12-23

## 2021-12-20 RX ADMIN — PANTOPRAZOLE SODIUM 40 MILLIGRAM(S): 20 TABLET, DELAYED RELEASE ORAL at 05:27

## 2021-12-20 RX ADMIN — Medication 30 MILLIGRAM(S): at 05:27

## 2021-12-20 RX ADMIN — POLYETHYLENE GLYCOL 3350 17 GRAM(S): 17 POWDER, FOR SOLUTION ORAL at 12:53

## 2021-12-20 RX ADMIN — LIDOCAINE 1 PATCH: 4 CREAM TOPICAL at 12:52

## 2021-12-20 RX ADMIN — GABAPENTIN 300 MILLIGRAM(S): 400 CAPSULE ORAL at 13:28

## 2021-12-20 RX ADMIN — MAGNESIUM HYDROXIDE 30 MILLILITER(S): 400 TABLET, CHEWABLE ORAL at 17:48

## 2021-12-20 RX ADMIN — CYCLOBENZAPRINE HYDROCHLORIDE 5 MILLIGRAM(S): 10 TABLET, FILM COATED ORAL at 13:28

## 2021-12-20 RX ADMIN — CYCLOBENZAPRINE HYDROCHLORIDE 5 MILLIGRAM(S): 10 TABLET, FILM COATED ORAL at 05:27

## 2021-12-20 RX ADMIN — CYCLOBENZAPRINE HYDROCHLORIDE 10 MILLIGRAM(S): 10 TABLET, FILM COATED ORAL at 14:18

## 2021-12-20 RX ADMIN — Medication 30 MILLIGRAM(S): at 13:26

## 2021-12-20 RX ADMIN — GABAPENTIN 300 MILLIGRAM(S): 400 CAPSULE ORAL at 05:26

## 2021-12-20 RX ADMIN — Medication 30 MILLIGRAM(S): at 12:52

## 2021-12-20 NOTE — DISCHARGE NOTE NURSING/CASE MANAGEMENT/SOCIAL WORK - NSDCPEFALRISK_GEN_ALL_CORE
For information on Fall & Injury Prevention, visit: [email or web address] OR  [email or web address] OR  https://www.cdc.gov/steadi/patient.html

## 2021-12-20 NOTE — PROGRESS NOTE ADULT - PROBLEM SELECTOR PLAN 1
Pt with acute lumbar back pain radiating to b/l LE, left > right which is somatic and neuropathic in nature. pt with hx chronic LBP due to lumbar disc herniation, left sided sciatica. MRI thoracic lumbar spine- Mild disc degeneration at L5-S1 with loss of signal within the nucleus pulposus. Small central disc herniation at this level indents the ventral thecal sac.   Pt with allergy to codeine- Hives  Reports chest heaviness with IV morphine.   Will maximize non-opioid pain recommendations   - Continue Toradol 30mg IVP q 8 hours PRN severe pain  - Acetaminophen 1 gram PO q 8 hours PRN moderate pain (PRN per pt's request). Monitor LFTs  - Gabapentin 300mg po q 8 hours. Monitor renal function.   - Increase Flexeril 10mg PO TID x 3 days.   Bowel Regimen  - Senna 2 tablets at bedtime for constipation  Mild pain   - Non-pharmacological pain treatment recommendations  - Warm/ Cool packs PRN   - Repositioning, imagery, relaxation, distraction.  - Physical therapy OOB if no contraindications   Recommendations discussed with primary team and RN.  Upon discharge, recommend continue with gabapentin 300 TID, flexeril 10mg TID, naproxen 375 mg PO TID to follow up with community physician (will arrange) for and GUNNAR and may also follow with Telepain Dr. Chicas [phone] Pt with acute lumbar back pain radiating to b/l LE, left > right which is somatic and neuropathic in nature. pt with hx chronic LBP due to lumbar disc herniation, left sided sciatica. MRI thoracic lumbar spine- Mild disc degeneration at L5-S1 with loss of signal within the nucleus pulposus. Small central disc herniation at this level indents the ventral thecal sac.   Pt with allergy to codeine- Hives  Reports chest heaviness with IV morphine.   Will maximize non-opioid pain recommendations   - Continue Toradol 30mg IVP q 8 hours PRN severe pain  - Acetaminophen 1 gram PO q 8 hours PRN moderate pain (PRN per pt's request). Monitor LFTs  - Gabapentin 300mg po q 8 hours. Monitor renal function.   - Increase Flexeril 10mg PO TID x 3 days.   Bowel Regimen  - Senna 2 tablets at bedtime for constipation  Mild pain   - Non-pharmacological pain treatment recommendations  - Warm/ Cool packs PRN   - Repositioning, imagery, relaxation, distraction.  - Physical therapy OOB if no contraindications   Recommendations discussed with primary team and RN.  Upon discharge, recommend continue with gabapentin 300 TID, flexeril 10mg TID, naproxen 375 mg PO TID, decadron 4mg PO TID x 3 days, bowel regimen, PPI x 4 days to follow up with community physician (will arrange) for and GUNNAR and may also follow with Telepain Dr. Chicas [phone]. Provided pt with CD and copy of MRI. Questions and concerns addressed.

## 2021-12-20 NOTE — PROGRESS NOTE ADULT - ASSESSMENT
Acute recurrent episode of (probably lumbar/ doubt thoracic) disc disease.  Could be an annulus fibrosis tear (with bulge, without extrusion).  Could be elena herniation (extruded disc material).      No demonstrable motor or sensory deficits (proximal LE motor exam limited by pain).  No "root signature."     MR studies pending.      
Confidential Drug Utilization Report  Search Terms: Evelyne Dubois, 1985   Search Date: 12/18/2021 10:18:31 AM   The Drug Utilization Report below displays all of the controlled substance prescriptions, if any, that your patient has filled in the last twelve months. The information displayed on this report is compiled from pharmacy submissions to the Department, and accurately reflects the information as submitted by the pharmacies.  This report was requested by: Edelmira Prater | Reference #: [id number]   There are no results for the search terms that you entered.  
Patient is a 36 y./o F w/ PMHx of chronic low back pain due to lumbar disc herniation a/w Acute on chronic LBP possible 2/2 to lumbar disc herniation w/ sciatica
There is NO indication for steroid Rx for herniated or bulging intervertebral discs.  This Pt should not get ANY steroid Rx.    Her pain is on a structural basis.  She does NOT have neuropathic pain.  There is NO indication for gabapentin.      She has not had any muscle spasms, therefore does not need muscle relaxants.        RECOMMENDATIONS    D/C gabapentin.     No steroid medication.      D/C cylcobenzaprine.      Awaiting non-con MRs T-spin L-spine.

## 2021-12-20 NOTE — PHYSICAL THERAPY INITIAL EVALUATION ADULT - PATIENT/FAMILY/SIGNIFICANT OTHER GOALS STATEMENT, PT EVAL
return home; return to work; be able to perform mobility and functional activities independently and pain-free without an assistive device

## 2021-12-20 NOTE — PHYSICAL THERAPY INITIAL EVALUATION ADULT - GROSSLY INTACT, SENSORY
no impairments noted to light touch, pressure, pain, and proprioception on all extremities/Grossly Intact

## 2021-12-20 NOTE — PHYSICAL THERAPY INITIAL EVALUATION ADULT - ACTIVE RANGE OF MOTION EXAMINATION, REHAB EVAL
left hip flexion on SLR limited due to pain/no Active ROM deficits were identified/deficits as listed below

## 2021-12-20 NOTE — PROGRESS NOTE ADULT - SUBJECTIVE AND OBJECTIVE BOX
This is in follow-up to yesterday's initial Neurology Consult Note.  History as detailed therein.        Found comfortably asleep, supine in bed.  Alert with normal mentation once aroused.  No pain at this time.  Reports she continues to have pain in association with LE movement.      MR T and L-spine pending.  ESR 28.   CRP and RF ?not ordered.      Pt informs me on of her (doctors) mentioned using steroid medication.  I see that an order was entered for methylprednisolone 24 mg  PO one dose, then cancelled (none administered).  I also see an order for gabapentin 300mg TID  (vomited once, refused once, taken once).   There is an order for cyclobenzaprine (refused once, taken onc).  
Found asleep in bed.      Reports she has no pain when not moving.      On examination, as previously, intact motor function UEs, distally in LEs (ankle plantar and dorsiflexion, inversion, eversion; toe flexors and extensors.  Sensation intact to P/LT (face, neck, limbs, buttocks).  Guards against passive and active movements more proximally in the LEs.  Painful for her to turn on her side.      Awaiting MR T and L spine.    
  Source of information: BERNARDO CHINCHILLA, Chart review  Patient language: English  : n/a    HPI:  Patient is a 35 y/o F w/ PMHx of chronic LBP due to lumbar disc herniation. She was BIBEMS from home due to inability to walk w. assoc. low back pain. Patient woke-up this morning fine and was able to do her daily chores and have follow-up with her Chiropractor. There she had heating pads, massage and stretching exercise done on her. On her way home, she had a sudden onset of severe throbbing low midback pain (10/10) that is radiating to her left thigh. There was associated brief episode of numbness of all extremities. She denies any fever, headache, blurring of vision, nausea, vomiting, chest pain, shortness of breath, palpitation, abdominal pain, diarrhea, constipation. There was also no noted paralysis nor any other neurologic deficits She had a flare-up of her back pain last month but not associated with any difficulty walking. She had a brief episode of "URI" which resolved on its own 2 months ago. She also received her 2nd dose of Pfizer COVID vaccine on Oct. 18, 2021 and claims that she had no adverse reactions, She was able to drive over his dad's house wherein she still has low back pain but had difficulty rising from her car seat or even walking. This prompted them to call EMS and she was subsequently admitted.    [facility]: FULL CODE (18 Dec 2021 00:54)    Patient is a 35 y/o F w/ PMHx of chronic LBP due to lumbar disc herniation, left sided sciatica. MRI thoracic lumbar spine- Mild disc degeneration at L5-S1 with loss of signal within the nucleus pulposus. Small central disc herniation at this level indents the ventral thecal sac. Pain consulted for lumbar back pain. During initial consult, Pt reports pain started 12/17 after dropping off her children at school, denies change in activity, or usual routine, trauma. Reports going ot her chiropractor (which she has been seeing intermittently since being dx with lumbar bulging disc in 2012). Reports her pain started to flare up again 3 weeks ago and was seeing her chiropractor 3x since last month who was performing stretches and using heating pads and electrical stimulation. Reports hx GUNNAR x 2 in 2012, none since that time.   Pt seen and examined at bedside. Pt laying in bed, reports pain improved since Saturday. Reports pain score 5/10 while laying in bed, increases to 10/10 upon movement. SCALE USED: (1-10 VNRS). Pt describes pain as constant aching low back pain, intermittently shooting to b/l lower extremities, alleviated by pain medication and rest, exacerbated by movement sitting upright. Pt was able to get out of bed with PT, take a few steps and sit in a chair for a few moments. Reports numbness/tingling of b/l upper extremities spontaneously resolved in the ED on 12/18, and b/l LE numbness/tingling resolved 12/19.  Pt tolerating PO diet. Denies headaches, lethargy, chest pain, palpations, abdominal pain, nausea, vomiting, constipation, itchiness. Urinary catheter discontinued 12/19 overnight and pt has been voiding.  (Of note, pt reported chest heaviness after receiving IV morphine 4mg on 12/18. ) Reports constipation last BM 12/16. Patient stated goal for pain control: to be able to take deep breaths, get out of bed to chair and ambulate with tolerable pain control. Pt denies taking medications for pain at home. Pt requesting to be discharged today.        PAST MEDICAL & SURGICAL HISTORY:  No pertinent past medical history    No significant past surgical history        FAMILY HISTORY:  No pertinent family history in first degree relatives        Social History:  lives with her 2 children  works from home  non-smoker  non-alcoholic beverage drinker  no illicit drug use (18 Dec 2021 00:54)    Allergies    codeine (Hives)    MEDICATIONS  (STANDING):  acetaminophen     Tablet .. 1000 milliGRAM(s) Oral every 6 hours  cyclobenzaprine 10 milliGRAM(s) Oral three times a day  gabapentin 300 milliGRAM(s) Oral three times a day  ketorolac   Injectable 30 milliGRAM(s) IV Push every 8 hours  lidocaine   4% Patch 1 Patch Transdermal daily  pantoprazole    Tablet 40 milliGRAM(s) Oral before breakfast  polyethylene glycol 3350 17 Gram(s) Oral daily  senna 2 Tablet(s) Oral at bedtime  sodium chloride 0.9%. 1000 milliLiter(s) (75 mL/Hr) IV Continuous <Continuous>    MEDICATIONS  (PRN):  ondansetron Injectable 4 milliGRAM(s) IV Push every 6 hours PRN Nausea and/or Vomiting      Vital Signs Last 24 Hrs  T(C): 36.8 (20 Dec 2021 14:09), Max: 36.9 (19 Dec 2021 21:13)  T(F): 98.2 (20 Dec 2021 14:09), Max: 98.5 (19 Dec 2021 21:13)  HR: 84 (20 Dec 2021 14:09) (71 - 85)  BP: 102/46 (20 Dec 2021 14:09) (100/52 - 102/61)  BP(mean): --  RR: 16 (20 Dec 2021 14:09) (16 - 18)  SpO2: 100% (20 Dec 2021 14:09) (99% - 100%)  COVID-19 PCR: NotDetec (17 Dec 2021 21:42)    LABS: Reviewed                          10.0   6.61  )-----------( 272      ( 20 Dec 2021 05:52 )             31.6     12-20    138  |  107  |  15  ----------------------------<  91  3.9   |  28  |  0.57    Ca    8.6      20 Dec 2021 05:52  Phos  3.2     12-20  Mg     2.2     12-20    TPro  6.7  /  Alb  2.6<L>  /  TBili  0.1<L>  /  DBili  x   /  AST  12  /  ALT  14  /  AlkPhos  61  12-20      LIVER FUNCTIONS - ( 20 Dec 2021 05:52 )  Alb: 2.6 g/dL / Pro: 6.7 g/dL / ALK PHOS: 61 U/L / ALT: 14 U/L DA / AST: 12 U/L / GGT: x             CAPILLARY BLOOD GLUCOSE    COVID-19 PCR: NotDetec (17 Dec 2021 21:42)      Radiology: Reviewed.   < from: MR Thoracic Spine No Cont (12.20.21 @ 12:42) >    ACC: [id number] EXAM:  MR SPINE LUMBAR                        ACC: [id number] EXAM:  MR SPINE THORACIC                          PROCEDURE DATE:  12/20/2021          INTERPRETATION:  MR thoracic and lumbar without gadolinium    CLINICAL INDICATION:  Chronic mid and low back pain    TECHNIQUE:   Sagittal  and axial T1-weighted images, sagittal STIR   images,  and sagittal and axial T2-weighted images of the thoracic and   lumbar spine were obtained.    FINDINGS:   No prior similar studies are available for review.    Thoracic and lumbar vertebral alignment is preserved.  Thoracic vertebral   body heights are maintained.  Marrow signal intensity within thoracic   vertebral bodies and posterior elements is unremarkable.  No osseous   expansion, epidural disease or paraspinal abnormality is found.    Thoracic intervertebral discs maintain intact disc heights and signal   intensity.  No central canal or foraminal compromise is recognized.   Lumbar intervertebral discs demonstrate mild disc degeneration at L5-S1   with loss of signal within the nucleus pulposus. Small central disc   herniation at this level indents the ventral thecal sac.    The thoracic cord maintains intact morphology.  Thoracic cord signal   intensity is intact. The conus medullaris terminates at L1.      IMPRESSION:  Mild disc degeneration at L5-S1 with loss of signal within   the nucleus pulposus. Small central disc herniation at this level indents   the ventral thecal sac.      --- End of Report ---            HERMINIA HODGE; Attending Radiologist  This document has been electronically signed. Dec 20 2021  1:55PM    < end of copied text >    < from: Xray Chest 1 View-PORTABLE IMMEDIATE (Xray Chest 1 View-PORTABLE IMMEDIATE .) (12.18.21 @ 01:08) >    ACC: [id number] EXAM:  XR CHEST PORTABLE IMMED 1V                          PROCEDURE DATE:  12/18/2021          INTERPRETATION:  Lower back pain.    AP chest.    Heart and mediastinum normal.  Lungs clear.  Costophrenic angles sharp   bilaterally.    IMPRESSION: Normal chest    --- End of Report ---            SHALINI WILKINS MD; Attending Radiologist  This document has been electronically signed. Dec 18 2021 12:46PM    < end of copied text >        ORT Score -   Family Hx of substance abuse	Female	      Male  Alcohol 	                                           1                     3  Illegal drugs	                                   2                     3  Rx drugs                                           4 	                  4  Personal Hx of substance abuse		  Alcohol 	                                          3	                  3  Illegal drugs                                     4	                  4  Rx drugs                                            5 	                  5  Age between 16- 45 years	           1                     1  hx preadolescent sexual abuse	   3 	                  0  Psychological disease		  ADD, OCD, bipolar, schizophrenia   2	          2  Depression                                           1 	          1  Total: 1    a score of 3 or lower indicates low risk for opioid abuse		  a score of 4-7 indicates moderate risk for opioid abuse		  a score of 8 or higher indicates high risk for opioid abuse    REVIEW OF SYSTEMS:  CONSTITUTIONAL: No fever or fatigue  HEENT:  No difficulty hearing, no change in vision  NECK: No pain or stiffness  RESPIRATORY: No cough, wheezing, chills or hemoptysis; No shortness of breath  CARDIOVASCULAR: No chest pain, palpitations, dizziness, or leg swelling  GASTROINTESTINAL: No loss of appetite, decreased PO intake. No abdominal or epigastric pain. No nausea, vomiting; No diarrhea or constipation.   GENITOURINARY: No dysuria, frequency, hematuria, retention or incontinence  MUSCULOSKELETAL: hx CLBP. + lumbar back pain radiating to b/l LE. No swelling; + decreased lower motor strength weakness; Denies upper extremity pain, no upper motor strength weakness. no saddle anesthesia, bowel/bladder incontinence, no falls   NEURO: No headaches, Reports numbness/tingling b/l LE resolved (also reports b/l upper extremities numbness/ tingling resolved)   PSYCHIATRIC: No depression, anxiety or difficulty sleeping    PHYSICAL EXAM:  GENERAL:  Alert & Oriented X4, cooperative, NAD, Good concentration. Speech is clear.   RESPIRATORY: Respirations even and unlabored. Clear to auscultation bilaterally; No rales, rhonchi, wheezing, or rubs  CARDIOVASCULAR: Normal S1/S2, regular rate and rhythm; No murmurs, rubs, or gallops. No JVD.   GASTROINTESTINAL:  Soft, Nontender, Nondistended; Bowel sounds present  PERIPHERAL VASCULAR:  Extremities warm without edema. 2+ Peripheral Pulses, No cyanosis, No calf tenderness  MUSCULOSKELETAL: Pt refused assessment of back due to increased pain, + dorsiflexion and plantarflexion of b/l LE. + SLR b/l at 10 degrees. Motor Strength 5/5 B/L upper extremities; ROM b/l UE intact   SKIN: Warm, dry, intact. No rashes, lesions, scars or wounds.     Risk factors associated with adverse outcomes related to opioid treatment  [ ]  Concurrent benzodiazepine use  [ ]  History/ Active substance use or alcohol use disorder  [ ] Psychiatric co-morbidity  [ ] Sleep apnea  [ ] COPD  [ ] BMI> 35  [ ] Liver dysfunction  [ ] Renal dysfunction  [ ] CHF  [ ] Smoker  [ ]  Age > 60 years    [X ]  [place]  Reviewed and Copied to Chart. See below.    Plan of care and goal oriented pain management treatment options were discussed with patient and /or primary care giver; all questions and concerns were addressed and care was aligned with patient's wishes.    Educated patient on goal oriented pain management treatment options     12-20-21 @ 14:32    
      Patient is a 36y old  Female who presents with a chief complaint of ambulatory dysfunction (18 Dec 2021 20:35)      SUBJECTIVE / OVERNIGHT EVENTS: Austin was placed yesterday due to urinary retention and as patient was unable to walk to bathroom and doesn't want to urinate on bed due to pain. No cauda equina s/s. pain radiates down to left LE.  She is frustrated due to not being able to [facility]inez MRI over the weekend. Neurology has seen the patient and does not deem it urgent. The patients back pain is chronic and was exacerbated after her session with chiropractor.   per her Neurontin and flexeril helped with the pain.     MEDICATIONS  (STANDING):  acetaminophen     Tablet .. 1000 milliGRAM(s) Oral every 6 hours  cyclobenzaprine 5 milliGRAM(s) Oral three times a day  gabapentin 300 milliGRAM(s) Oral three times a day  ketorolac   Injectable 30 milliGRAM(s) IV Push every 8 hours  lidocaine   4% Patch 1 Patch Transdermal daily  pantoprazole    Tablet 40 milliGRAM(s) Oral before breakfast  polyethylene glycol 3350 17 Gram(s) Oral daily  senna 2 Tablet(s) Oral at bedtime  sodium chloride 0.9%. 1000 milliLiter(s) (75 mL/Hr) IV Continuous <Continuous>    MEDICATIONS  (PRN):  ondansetron Injectable 4 milliGRAM(s) IV Push every 6 hours PRN Nausea and/or Vomiting      Vital Signs Last 24 Hrs  T(C): 36.2 (21 @ 12:52)  T(F): 97.1 (21 @ 12:52), Max: 98.4 (21 @ 21:38)  HR: 92 (21 @ 12:52) (72 - 92)  BP: 104/53 (21 @ 12:52)  BP(mean): --  RR: 17 (21 @ 12:52) (16 - 18)  SpO2: 99% (21 @ 12:52) (98% - 99%)  Wt(kg): --     @ 07:01  -   @ 07:00  --------------------------------------------------------  IN: 0 mL / OUT: 700 mL / NET: -700 mL     @ 07:  -   @ 13:40  --------------------------------------------------------  IN: 0 mL / OUT: 200 mL / NET: -200 mL      CAPILLARY BLOOD GLUCOSE        I&O's Summary    18 Dec 2021 07:  -  19 Dec 2021 07:00  --------------------------------------------------------  IN: 0 mL / OUT: 700 mL / NET: -700 mL    19 Dec 2021 07:  -  19 Dec 2021 13:40  --------------------------------------------------------  IN: 0 mL / OUT: 200 mL / NET: -200 mL        PHYSICAL EXAM:  GENERAL: NAD, well-developed  HEAD:  Atraumatic, Normocephalic  EYES: EOMI, PERRLA, conjunctiva and sclera clear  NECK: Supple, No JVD  CHEST/LUNG: Clear to auscultation bilaterally; No wheeze  HEART: Regular rate and rhythm; No murmurs, rubs, or gallops  ABDOMEN: Soft, Nontender, Nondistended; Bowel sounds present  EXTREMITIES:  2+ Peripheral Pulses, No clubbing, cyanosis, or edema  PSYCH: AAOx3  SKIN: No rashes or lesions  MSK- pain on movement in lower back  austin+    LABS:                        9.9    5.00  )-----------( 262      ( 18 Dec 2021 05:27 )             31.1     12-18    141  |  110<H>  |  10  ----------------------------<  88  3.9   |  27  |  0.57    Ca    8.3<L>      18 Dec 2021 05:27  Phos  2.8     12-18  Mg     2.2     12-18    TPro  7.0  /  Alb  2.9<L>  /  TBili  0.2  /  DBili  x   /  AST  12  /  ALT  10  /  AlkPhos  68  12-18    PT/INR - ( 17 Dec 2021 19:55 )   PT: 13.5 sec;   INR: 1.14 ratio         PTT - ( 17 Dec 2021 19:55 )  PTT:30.9 sec  CARDIAC MARKERS ( 17 Dec 2021 19:55 )  x     / x     / 156 U/L / x     / x          Urinalysis Basic - ( 17 Dec 2021 21:42 )    Color: Yellow / Appearance: Clear / S.015 / pH: x  Gluc: x / Ketone: Small  / Bili: Negative / Urobili: Negative   Blood: x / Protein: 15 / Nitrite: Negative   Leuk Esterase: Negative / RBC: 0-2 /HPF / WBC 0-2 /HPF   Sq Epi: x / Non Sq Epi: Few /HPF / Bacteria: Few /HPF        RADIOLOGY & ADDITIONAL TESTS:    Imaging Personally Reviewed:    Consultant(s) Notes Reviewed:      Care Discussed with Consultants/Other Providers:    Assessment and Plan:

## 2021-12-20 NOTE — DISCHARGE NOTE NURSING/CASE MANAGEMENT/SOCIAL WORK - PATIENT PORTAL LINK FT
You can access the FollowMyHealth Patient Portal offered by [facility] by registering at the following website: [email or web address]. By joining [employer]’s FollowMyHealth portal, you will also be able to view your health information using other applications (apps) compatible with our system.

## 2021-12-20 NOTE — PHYSICAL THERAPY INITIAL EVALUATION ADULT - PERTINENT HX OF CURRENT PROBLEM, REHAB EVAL
ambulatory dysfunction; h/o chronic low back pain; recent MRI reveals " Mild disc degeneration at L5-S1 with loss of signal within the nucleus pulposus. Small central disc herniation at this level indents the ventral thecal sac."

## 2021-12-20 NOTE — PHYSICAL THERAPY INITIAL EVALUATION ADULT - MODALITIES TREATMENT COMMENTS
patient reports she was recently able to void urine but currently still awaiting being able to perform bowel movement

## 2022-06-27 NOTE — PATIENT PROFILE ADULT - NSPROIMPLANTSMEDDEV_GEN_A_NUR
Joanna [facility]     Platform used for Video Visit: Victor Manuel Stroud, Virtual Visit Facilitator       None

## 2023-05-08 ENCOUNTER — NON-APPOINTMENT (OUTPATIENT)
Age: 38
End: 2023-05-08

## 2023-05-09 ENCOUNTER — ASOB RESULT (OUTPATIENT)
Age: 38
End: 2023-05-09

## 2023-05-09 ENCOUNTER — APPOINTMENT (OUTPATIENT)
Dept: ANTEPARTUM | Facility: CLINIC | Age: 38
End: 2023-05-09
Payer: COMMERCIAL

## 2023-05-09 PROCEDURE — 76813 OB US NUCHAL MEAS 1 GEST: CPT | Mod: 59

## 2023-05-09 PROCEDURE — 76801 OB US < 14 WKS SINGLE FETUS: CPT

## 2023-05-09 PROCEDURE — 36415 COLL VENOUS BLD VENIPUNCTURE: CPT

## 2023-07-06 ENCOUNTER — APPOINTMENT (OUTPATIENT)
Dept: ANTEPARTUM | Facility: CLINIC | Age: 38
End: 2023-07-06
Payer: COMMERCIAL

## 2023-07-06 ENCOUNTER — ASOB RESULT (OUTPATIENT)
Age: 38
End: 2023-07-06

## 2023-07-06 PROCEDURE — 76811 OB US DETAILED SNGL FETUS: CPT

## 2023-07-13 ENCOUNTER — ASOB RESULT (OUTPATIENT)
Age: 38
End: 2023-07-13

## 2023-07-13 ENCOUNTER — APPOINTMENT (OUTPATIENT)
Dept: ANTEPARTUM | Facility: CLINIC | Age: 38
End: 2023-07-13
Payer: COMMERCIAL

## 2023-07-13 PROCEDURE — 76816 OB US FOLLOW-UP PER FETUS: CPT

## 2023-11-12 ENCOUNTER — OUTPATIENT (OUTPATIENT)
Dept: INPATIENT UNIT | Facility: HOSPITAL | Age: 38
LOS: 1 days | Discharge: HOME | End: 2023-11-12

## 2023-11-12 VITALS
RESPIRATION RATE: 16 BRPM | SYSTOLIC BLOOD PRESSURE: 124 MMHG | HEART RATE: 98 BPM | TEMPERATURE: 98 F | DIASTOLIC BLOOD PRESSURE: 62 MMHG

## 2023-11-12 VITALS — HEART RATE: 88 BPM | DIASTOLIC BLOOD PRESSURE: 52 MMHG | SYSTOLIC BLOOD PRESSURE: 101 MMHG

## 2023-11-12 DIAGNOSIS — O26.899 OTHER SPECIFIED PREGNANCY RELATED CONDITIONS, UNSPECIFIED TRIMESTER: ICD-10-CM

## 2023-11-12 LAB
ALBUMIN SERPL ELPH-MCNC: 3.3 G/DL — SIGNIFICANT CHANGE UP (ref 3.3–5)
ALBUMIN SERPL ELPH-MCNC: 3.3 G/DL — SIGNIFICANT CHANGE UP (ref 3.3–5)
ALP SERPL-CCNC: 195 U/L — HIGH (ref 40–120)
ALP SERPL-CCNC: 195 U/L — HIGH (ref 40–120)
ALT FLD-CCNC: 20 U/L — SIGNIFICANT CHANGE UP (ref 4–33)
ALT FLD-CCNC: 20 U/L — SIGNIFICANT CHANGE UP (ref 4–33)
AMYLASE P1 CFR SERPL: 113 U/L — SIGNIFICANT CHANGE UP (ref 25–125)
AMYLASE P1 CFR SERPL: 113 U/L — SIGNIFICANT CHANGE UP (ref 25–125)
ANION GAP SERPL CALC-SCNC: 12 MMOL/L — SIGNIFICANT CHANGE UP (ref 7–14)
ANION GAP SERPL CALC-SCNC: 12 MMOL/L — SIGNIFICANT CHANGE UP (ref 7–14)
APPEARANCE UR: CLEAR — SIGNIFICANT CHANGE UP
APPEARANCE UR: CLEAR — SIGNIFICANT CHANGE UP
APTT BLD: 27.1 SEC — SIGNIFICANT CHANGE UP (ref 24.5–35.6)
APTT BLD: 27.1 SEC — SIGNIFICANT CHANGE UP (ref 24.5–35.6)
AST SERPL-CCNC: 18 U/L — SIGNIFICANT CHANGE UP (ref 4–32)
AST SERPL-CCNC: 18 U/L — SIGNIFICANT CHANGE UP (ref 4–32)
BASOPHILS # BLD AUTO: 0.02 K/UL — SIGNIFICANT CHANGE UP (ref 0–0.2)
BASOPHILS # BLD AUTO: 0.02 K/UL — SIGNIFICANT CHANGE UP (ref 0–0.2)
BASOPHILS NFR BLD AUTO: 0.3 % — SIGNIFICANT CHANGE UP (ref 0–2)
BASOPHILS NFR BLD AUTO: 0.3 % — SIGNIFICANT CHANGE UP (ref 0–2)
BILIRUB SERPL-MCNC: <0.2 MG/DL — SIGNIFICANT CHANGE UP (ref 0.2–1.2)
BILIRUB SERPL-MCNC: <0.2 MG/DL — SIGNIFICANT CHANGE UP (ref 0.2–1.2)
BILIRUB UR-MCNC: NEGATIVE — SIGNIFICANT CHANGE UP
BILIRUB UR-MCNC: NEGATIVE — SIGNIFICANT CHANGE UP
BUN SERPL-MCNC: 10 MG/DL — SIGNIFICANT CHANGE UP (ref 7–23)
BUN SERPL-MCNC: 10 MG/DL — SIGNIFICANT CHANGE UP (ref 7–23)
CALCIUM SERPL-MCNC: 9.2 MG/DL — SIGNIFICANT CHANGE UP (ref 8.4–10.5)
CALCIUM SERPL-MCNC: 9.2 MG/DL — SIGNIFICANT CHANGE UP (ref 8.4–10.5)
CHLORIDE SERPL-SCNC: 104 MMOL/L — SIGNIFICANT CHANGE UP (ref 98–107)
CHLORIDE SERPL-SCNC: 104 MMOL/L — SIGNIFICANT CHANGE UP (ref 98–107)
CO2 SERPL-SCNC: 22 MMOL/L — SIGNIFICANT CHANGE UP (ref 22–31)
CO2 SERPL-SCNC: 22 MMOL/L — SIGNIFICANT CHANGE UP (ref 22–31)
COLOR SPEC: YELLOW — SIGNIFICANT CHANGE UP
COLOR SPEC: YELLOW — SIGNIFICANT CHANGE UP
CREAT ?TM UR-MCNC: 105 MG/DL — SIGNIFICANT CHANGE UP
CREAT ?TM UR-MCNC: 105 MG/DL — SIGNIFICANT CHANGE UP
CREAT SERPL-MCNC: 0.38 MG/DL — LOW (ref 0.5–1.3)
CREAT SERPL-MCNC: 0.38 MG/DL — LOW (ref 0.5–1.3)
DIFF PNL FLD: NEGATIVE — SIGNIFICANT CHANGE UP
DIFF PNL FLD: NEGATIVE — SIGNIFICANT CHANGE UP
EGFR: 133 ML/MIN/1.73M2 — SIGNIFICANT CHANGE UP
EGFR: 133 ML/MIN/1.73M2 — SIGNIFICANT CHANGE UP
EOSINOPHIL # BLD AUTO: 0.18 K/UL — SIGNIFICANT CHANGE UP (ref 0–0.5)
EOSINOPHIL # BLD AUTO: 0.18 K/UL — SIGNIFICANT CHANGE UP (ref 0–0.5)
EOSINOPHIL NFR BLD AUTO: 2.3 % — SIGNIFICANT CHANGE UP (ref 0–6)
EOSINOPHIL NFR BLD AUTO: 2.3 % — SIGNIFICANT CHANGE UP (ref 0–6)
FIBRINOGEN PPP-MCNC: 443 MG/DL — SIGNIFICANT CHANGE UP (ref 200–465)
FIBRINOGEN PPP-MCNC: 443 MG/DL — SIGNIFICANT CHANGE UP (ref 200–465)
FLUAV AG NPH QL: SIGNIFICANT CHANGE UP
FLUAV AG NPH QL: SIGNIFICANT CHANGE UP
FLUBV AG NPH QL: SIGNIFICANT CHANGE UP
FLUBV AG NPH QL: SIGNIFICANT CHANGE UP
GLUCOSE SERPL-MCNC: 100 MG/DL — HIGH (ref 70–99)
GLUCOSE SERPL-MCNC: 100 MG/DL — HIGH (ref 70–99)
GLUCOSE UR QL: NEGATIVE MG/DL — SIGNIFICANT CHANGE UP
GLUCOSE UR QL: NEGATIVE MG/DL — SIGNIFICANT CHANGE UP
HCT VFR BLD CALC: 30.8 % — LOW (ref 34.5–45)
HCT VFR BLD CALC: 30.8 % — LOW (ref 34.5–45)
HGB BLD-MCNC: 10.1 G/DL — LOW (ref 11.5–15.5)
HGB BLD-MCNC: 10.1 G/DL — LOW (ref 11.5–15.5)
IANC: 5.47 K/UL — SIGNIFICANT CHANGE UP (ref 1.8–7.4)
IANC: 5.47 K/UL — SIGNIFICANT CHANGE UP (ref 1.8–7.4)
IMM GRANULOCYTES NFR BLD AUTO: 0.5 % — SIGNIFICANT CHANGE UP (ref 0–0.9)
IMM GRANULOCYTES NFR BLD AUTO: 0.5 % — SIGNIFICANT CHANGE UP (ref 0–0.9)
INR BLD: 0.92 RATIO — SIGNIFICANT CHANGE UP (ref 0.85–1.18)
INR BLD: 0.92 RATIO — SIGNIFICANT CHANGE UP (ref 0.85–1.18)
KETONES UR-MCNC: NEGATIVE MG/DL — SIGNIFICANT CHANGE UP
KETONES UR-MCNC: NEGATIVE MG/DL — SIGNIFICANT CHANGE UP
LDH SERPL L TO P-CCNC: 150 U/L — SIGNIFICANT CHANGE UP (ref 135–225)
LDH SERPL L TO P-CCNC: 150 U/L — SIGNIFICANT CHANGE UP (ref 135–225)
LEUKOCYTE ESTERASE UR-ACNC: NEGATIVE — SIGNIFICANT CHANGE UP
LEUKOCYTE ESTERASE UR-ACNC: NEGATIVE — SIGNIFICANT CHANGE UP
LIDOCAIN IGE QN: 10 U/L — SIGNIFICANT CHANGE UP (ref 7–60)
LIDOCAIN IGE QN: 10 U/L — SIGNIFICANT CHANGE UP (ref 7–60)
LYMPHOCYTES # BLD AUTO: 1.55 K/UL — SIGNIFICANT CHANGE UP (ref 1–3.3)
LYMPHOCYTES # BLD AUTO: 1.55 K/UL — SIGNIFICANT CHANGE UP (ref 1–3.3)
LYMPHOCYTES # BLD AUTO: 20.2 % — SIGNIFICANT CHANGE UP (ref 13–44)
LYMPHOCYTES # BLD AUTO: 20.2 % — SIGNIFICANT CHANGE UP (ref 13–44)
MCHC RBC-ENTMCNC: 29.4 PG — SIGNIFICANT CHANGE UP (ref 27–34)
MCHC RBC-ENTMCNC: 29.4 PG — SIGNIFICANT CHANGE UP (ref 27–34)
MCHC RBC-ENTMCNC: 32.8 GM/DL — SIGNIFICANT CHANGE UP (ref 32–36)
MCHC RBC-ENTMCNC: 32.8 GM/DL — SIGNIFICANT CHANGE UP (ref 32–36)
MCV RBC AUTO: 89.5 FL — SIGNIFICANT CHANGE UP (ref 80–100)
MCV RBC AUTO: 89.5 FL — SIGNIFICANT CHANGE UP (ref 80–100)
MONOCYTES # BLD AUTO: 0.42 K/UL — SIGNIFICANT CHANGE UP (ref 0–0.9)
MONOCYTES # BLD AUTO: 0.42 K/UL — SIGNIFICANT CHANGE UP (ref 0–0.9)
MONOCYTES NFR BLD AUTO: 5.5 % — SIGNIFICANT CHANGE UP (ref 2–14)
MONOCYTES NFR BLD AUTO: 5.5 % — SIGNIFICANT CHANGE UP (ref 2–14)
NEUTROPHILS # BLD AUTO: 5.47 K/UL — SIGNIFICANT CHANGE UP (ref 1.8–7.4)
NEUTROPHILS # BLD AUTO: 5.47 K/UL — SIGNIFICANT CHANGE UP (ref 1.8–7.4)
NEUTROPHILS NFR BLD AUTO: 71.2 % — SIGNIFICANT CHANGE UP (ref 43–77)
NEUTROPHILS NFR BLD AUTO: 71.2 % — SIGNIFICANT CHANGE UP (ref 43–77)
NITRITE UR-MCNC: NEGATIVE — SIGNIFICANT CHANGE UP
NITRITE UR-MCNC: NEGATIVE — SIGNIFICANT CHANGE UP
NRBC # BLD: 0 /100 WBCS — SIGNIFICANT CHANGE UP (ref 0–0)
NRBC # BLD: 0 /100 WBCS — SIGNIFICANT CHANGE UP (ref 0–0)
NRBC # FLD: 0 K/UL — SIGNIFICANT CHANGE UP (ref 0–0)
NRBC # FLD: 0 K/UL — SIGNIFICANT CHANGE UP (ref 0–0)
PH UR: 6 — SIGNIFICANT CHANGE UP (ref 5–8)
PH UR: 6 — SIGNIFICANT CHANGE UP (ref 5–8)
PLATELET # BLD AUTO: 269 K/UL — SIGNIFICANT CHANGE UP (ref 150–400)
PLATELET # BLD AUTO: 269 K/UL — SIGNIFICANT CHANGE UP (ref 150–400)
POTASSIUM SERPL-MCNC: 3.9 MMOL/L — SIGNIFICANT CHANGE UP (ref 3.5–5.3)
POTASSIUM SERPL-MCNC: 3.9 MMOL/L — SIGNIFICANT CHANGE UP (ref 3.5–5.3)
POTASSIUM SERPL-SCNC: 3.9 MMOL/L — SIGNIFICANT CHANGE UP (ref 3.5–5.3)
POTASSIUM SERPL-SCNC: 3.9 MMOL/L — SIGNIFICANT CHANGE UP (ref 3.5–5.3)
PROT ?TM UR-MCNC: 20 MG/DL — SIGNIFICANT CHANGE UP
PROT SERPL-MCNC: 6.8 G/DL — SIGNIFICANT CHANGE UP (ref 6–8.3)
PROT SERPL-MCNC: 6.8 G/DL — SIGNIFICANT CHANGE UP (ref 6–8.3)
PROT UR-MCNC: SIGNIFICANT CHANGE UP MG/DL
PROT UR-MCNC: SIGNIFICANT CHANGE UP MG/DL
PROT/CREAT UR-RTO: 0.2 RATIO — SIGNIFICANT CHANGE UP (ref 0–0.2)
PROT/CREAT UR-RTO: 0.2 RATIO — SIGNIFICANT CHANGE UP (ref 0–0.2)
PROTHROM AB SERPL-ACNC: 10.3 SEC — SIGNIFICANT CHANGE UP (ref 9.5–13)
PROTHROM AB SERPL-ACNC: 10.3 SEC — SIGNIFICANT CHANGE UP (ref 9.5–13)
RBC # BLD: 3.44 M/UL — LOW (ref 3.8–5.2)
RBC # BLD: 3.44 M/UL — LOW (ref 3.8–5.2)
RBC # FLD: 14.4 % — SIGNIFICANT CHANGE UP (ref 10.3–14.5)
RBC # FLD: 14.4 % — SIGNIFICANT CHANGE UP (ref 10.3–14.5)
RSV RNA NPH QL NAA+NON-PROBE: SIGNIFICANT CHANGE UP
RSV RNA NPH QL NAA+NON-PROBE: SIGNIFICANT CHANGE UP
SARS-COV-2 RNA SPEC QL NAA+PROBE: SIGNIFICANT CHANGE UP
SARS-COV-2 RNA SPEC QL NAA+PROBE: SIGNIFICANT CHANGE UP
SODIUM SERPL-SCNC: 138 MMOL/L — SIGNIFICANT CHANGE UP (ref 135–145)
SODIUM SERPL-SCNC: 138 MMOL/L — SIGNIFICANT CHANGE UP (ref 135–145)
SP GR SPEC: 1.02 — SIGNIFICANT CHANGE UP (ref 1–1.03)
SP GR SPEC: 1.02 — SIGNIFICANT CHANGE UP (ref 1–1.03)
URATE SERPL-MCNC: 4.9 MG/DL — SIGNIFICANT CHANGE UP (ref 2.5–7)
URATE SERPL-MCNC: 4.9 MG/DL — SIGNIFICANT CHANGE UP (ref 2.5–7)
UROBILINOGEN FLD QL: 0.2 MG/DL — SIGNIFICANT CHANGE UP (ref 0.2–1)
UROBILINOGEN FLD QL: 0.2 MG/DL — SIGNIFICANT CHANGE UP (ref 0.2–1)
WBC # BLD: 7.68 K/UL — SIGNIFICANT CHANGE UP (ref 3.8–10.5)
WBC # BLD: 7.68 K/UL — SIGNIFICANT CHANGE UP (ref 3.8–10.5)
WBC # FLD AUTO: 7.68 K/UL — SIGNIFICANT CHANGE UP (ref 3.8–10.5)
WBC # FLD AUTO: 7.68 K/UL — SIGNIFICANT CHANGE UP (ref 3.8–10.5)

## 2023-11-12 RX ORDER — METOCLOPRAMIDE HCL 10 MG
10 TABLET ORAL ONCE
Refills: 0 | Status: DISCONTINUED | OUTPATIENT
Start: 2023-11-12 | End: 2023-11-12

## 2023-11-12 RX ORDER — ACETAMINOPHEN 500 MG
1000 TABLET ORAL ONCE
Refills: 0 | Status: COMPLETED | OUTPATIENT
Start: 2023-11-12 | End: 2023-11-12

## 2023-11-12 RX ORDER — SODIUM CHLORIDE 9 MG/ML
3 INJECTION INTRAMUSCULAR; INTRAVENOUS; SUBCUTANEOUS EVERY 8 HOURS
Refills: 0 | Status: DISCONTINUED | OUTPATIENT
Start: 2023-11-12 | End: 2023-11-26

## 2023-11-12 RX ORDER — DIPHENHYDRAMINE HCL 50 MG
25 CAPSULE ORAL ONCE
Refills: 0 | Status: DISCONTINUED | OUTPATIENT
Start: 2023-11-12 | End: 2023-11-12

## 2023-11-12 RX ADMIN — Medication 1000 MILLIGRAM(S): at 13:35

## 2023-11-12 RX ADMIN — Medication 1000 MILLIGRAM(S): at 12:38

## 2023-11-12 NOTE — OB PROVIDER TRIAGE NOTE - ADDITIONAL INSTRUCTIONS
37yo  at 38+5 presenting to ob triage from home for persistent headache, rule out HELLP, and complaints of itching rule out IHCP.  Pt. extensively educated on trigger warnings on when to return to ob triage, pt. verbalizes understanding.  Pt. schedule for IOL 11/15/23, pt. clinically stable for discharge home  Discussed with Dr. Cui

## 2023-11-12 NOTE — OB PROVIDER TRIAGE NOTE - NSOBPROVIDERNOTE_OBGYN_ALL_OB_FT
39yo  at 38+5 presenting to ob triage from home for persistent headache, rule out HELLP, and complaints of itching rule out IHCP.  Pt. asymptomat for itching of the hands and fett at bedside    Plan:  NST- reactive, moderate variability  HELLP labs, AST/ALT, Cr, BUN -->all within normal limits  Tylenol 1000mg for headache 9/10-->5/10  TAUS: vertex, cephalic Placenta: fundal  BPP: FREDDIE:8.59  Headache cocktail per -->Ordered  Pt. extensively educated on trigger warnings on when to return to ob triage, pt. verbalizes understanding.  Pt. schedule for IOL 11/15/23  Discussed with Dr. Cui 39yo  at 38+5 presenting to ob triage from home for persistent headache, rule out HELLP, and complaints of itching rule out IHCP.  Pt. asymptomatic for itching of the hands and feet at bedside    Plan:  NST- reactive, moderate variability  HELLP labs, AST/ALT, Cr, BUN -->all within normal limits  Tylenol 1000mg for headache 9/10-->5/10  TAUS: vertex, cephalic Placenta: fundal  BPP: FREDDIE:8.59  Headache cocktail per Dr. Cui-->PT. DECLINES  Pt. extensively educated on trigger warnings on when to return to ob triage, pt. verbalizes understanding.  Pt. schedule for IOL 11/15/23, pt. clinically stable for discharge home  Discussed with Dr. Cui

## 2023-11-12 NOTE — OB PROVIDER TRIAGE NOTE - HISTORY OF PRESENT ILLNESS
39yo  at 38+5 presenting to ob triage from home for headache, rule out HELLP, IHCP, complaints of itching. Pt. denies decreased fetal movement, visualization of blood, fluids, fever, cough, chills, sob, palpitations, n/v.

## 2023-11-12 NOTE — OB PROVIDER TRIAGE NOTE - NSHPLABSRESULTS_GEN_ALL_CORE
Urine analysis-Negative  Pr/Cr 0.2  UA 4.9     Fibrinogen 443  Amylase  113  Lipase  10  BA-->drawn, to be follow-ed up in a few days when resulted    AST 18  ALT  20  BUN 10  Cr  0.38

## 2023-11-14 DIAGNOSIS — O09.523 SUPERVISION OF ELDERLY MULTIGRAVIDA, THIRD TRIMESTER: ICD-10-CM

## 2023-11-14 DIAGNOSIS — O26.893 OTHER SPECIFIED PREGNANCY RELATED CONDITIONS, THIRD TRIMESTER: ICD-10-CM

## 2023-11-14 DIAGNOSIS — R51.9 HEADACHE, UNSPECIFIED: ICD-10-CM

## 2023-11-14 DIAGNOSIS — Z3A.38 38 WEEKS GESTATION OF PREGNANCY: ICD-10-CM

## 2023-11-14 DIAGNOSIS — L29.9 PRURITUS, UNSPECIFIED: ICD-10-CM

## 2023-11-14 DIAGNOSIS — O99.713 DISEASES OF THE SKIN AND SUBCUTANEOUS TISSUE COMPLICATING PREGNANCY, THIRD TRIMESTER: ICD-10-CM

## 2023-11-14 LAB
BILE AC FLD-MCNC: 3.3 UMOL/L — SIGNIFICANT CHANGE UP (ref 0–10)
BILE AC FLD-MCNC: 3.3 UMOL/L — SIGNIFICANT CHANGE UP (ref 0–10)

## 2023-11-16 ENCOUNTER — TRANSCRIPTION ENCOUNTER (OUTPATIENT)
Age: 38
End: 2023-11-16

## 2023-11-16 ENCOUNTER — INPATIENT (INPATIENT)
Facility: HOSPITAL | Age: 38
LOS: 1 days | Discharge: HOME | End: 2023-11-18
Attending: STUDENT IN AN ORGANIZED HEALTH CARE EDUCATION/TRAINING PROGRAM | Admitting: STUDENT IN AN ORGANIZED HEALTH CARE EDUCATION/TRAINING PROGRAM

## 2023-11-16 VITALS
WEIGHT: 197.09 LBS | SYSTOLIC BLOOD PRESSURE: 119 MMHG | DIASTOLIC BLOOD PRESSURE: 75 MMHG | TEMPERATURE: 99 F | RESPIRATION RATE: 16 BRPM | HEART RATE: 87 BPM | HEIGHT: 63 IN

## 2023-11-16 LAB
BASOPHILS # BLD AUTO: 0.02 K/UL — SIGNIFICANT CHANGE UP (ref 0–0.2)
BASOPHILS # BLD AUTO: 0.02 K/UL — SIGNIFICANT CHANGE UP (ref 0–0.2)
BASOPHILS NFR BLD AUTO: 0.2 % — SIGNIFICANT CHANGE UP (ref 0–2)
BASOPHILS NFR BLD AUTO: 0.2 % — SIGNIFICANT CHANGE UP (ref 0–2)
BLD GP AB SCN SERPL QL: NEGATIVE — SIGNIFICANT CHANGE UP
BLD GP AB SCN SERPL QL: NEGATIVE — SIGNIFICANT CHANGE UP
EOSINOPHIL # BLD AUTO: 0.13 K/UL — SIGNIFICANT CHANGE UP (ref 0–0.5)
EOSINOPHIL # BLD AUTO: 0.13 K/UL — SIGNIFICANT CHANGE UP (ref 0–0.5)
EOSINOPHIL NFR BLD AUTO: 1.6 % — SIGNIFICANT CHANGE UP (ref 0–6)
EOSINOPHIL NFR BLD AUTO: 1.6 % — SIGNIFICANT CHANGE UP (ref 0–6)
HCT VFR BLD CALC: 31.9 % — LOW (ref 34.5–45)
HCT VFR BLD CALC: 31.9 % — LOW (ref 34.5–45)
HGB BLD-MCNC: 10.3 G/DL — LOW (ref 11.5–15.5)
HGB BLD-MCNC: 10.3 G/DL — LOW (ref 11.5–15.5)
IANC: 5.49 K/UL — SIGNIFICANT CHANGE UP (ref 1.8–7.4)
IANC: 5.49 K/UL — SIGNIFICANT CHANGE UP (ref 1.8–7.4)
IMM GRANULOCYTES NFR BLD AUTO: 0.5 % — SIGNIFICANT CHANGE UP (ref 0–0.9)
IMM GRANULOCYTES NFR BLD AUTO: 0.5 % — SIGNIFICANT CHANGE UP (ref 0–0.9)
LYMPHOCYTES # BLD AUTO: 1.78 K/UL — SIGNIFICANT CHANGE UP (ref 1–3.3)
LYMPHOCYTES # BLD AUTO: 1.78 K/UL — SIGNIFICANT CHANGE UP (ref 1–3.3)
LYMPHOCYTES # BLD AUTO: 22.1 % — SIGNIFICANT CHANGE UP (ref 13–44)
LYMPHOCYTES # BLD AUTO: 22.1 % — SIGNIFICANT CHANGE UP (ref 13–44)
MCHC RBC-ENTMCNC: 28.9 PG — SIGNIFICANT CHANGE UP (ref 27–34)
MCHC RBC-ENTMCNC: 28.9 PG — SIGNIFICANT CHANGE UP (ref 27–34)
MCHC RBC-ENTMCNC: 32.3 GM/DL — SIGNIFICANT CHANGE UP (ref 32–36)
MCHC RBC-ENTMCNC: 32.3 GM/DL — SIGNIFICANT CHANGE UP (ref 32–36)
MCV RBC AUTO: 89.6 FL — SIGNIFICANT CHANGE UP (ref 80–100)
MCV RBC AUTO: 89.6 FL — SIGNIFICANT CHANGE UP (ref 80–100)
MONOCYTES # BLD AUTO: 0.61 K/UL — SIGNIFICANT CHANGE UP (ref 0–0.9)
MONOCYTES # BLD AUTO: 0.61 K/UL — SIGNIFICANT CHANGE UP (ref 0–0.9)
MONOCYTES NFR BLD AUTO: 7.6 % — SIGNIFICANT CHANGE UP (ref 2–14)
MONOCYTES NFR BLD AUTO: 7.6 % — SIGNIFICANT CHANGE UP (ref 2–14)
NEUTROPHILS # BLD AUTO: 5.49 K/UL — SIGNIFICANT CHANGE UP (ref 1.8–7.4)
NEUTROPHILS # BLD AUTO: 5.49 K/UL — SIGNIFICANT CHANGE UP (ref 1.8–7.4)
NEUTROPHILS NFR BLD AUTO: 68 % — SIGNIFICANT CHANGE UP (ref 43–77)
NEUTROPHILS NFR BLD AUTO: 68 % — SIGNIFICANT CHANGE UP (ref 43–77)
NRBC # BLD: 0 /100 WBCS — SIGNIFICANT CHANGE UP (ref 0–0)
NRBC # BLD: 0 /100 WBCS — SIGNIFICANT CHANGE UP (ref 0–0)
NRBC # FLD: 0 K/UL — SIGNIFICANT CHANGE UP (ref 0–0)
NRBC # FLD: 0 K/UL — SIGNIFICANT CHANGE UP (ref 0–0)
PLATELET # BLD AUTO: 290 K/UL — SIGNIFICANT CHANGE UP (ref 150–400)
PLATELET # BLD AUTO: 290 K/UL — SIGNIFICANT CHANGE UP (ref 150–400)
RBC # BLD: 3.56 M/UL — LOW (ref 3.8–5.2)
RBC # BLD: 3.56 M/UL — LOW (ref 3.8–5.2)
RBC # FLD: 14.1 % — SIGNIFICANT CHANGE UP (ref 10.3–14.5)
RBC # FLD: 14.1 % — SIGNIFICANT CHANGE UP (ref 10.3–14.5)
RH IG SCN BLD-IMP: POSITIVE — SIGNIFICANT CHANGE UP
WBC # BLD: 8.07 K/UL — SIGNIFICANT CHANGE UP (ref 3.8–10.5)
WBC # BLD: 8.07 K/UL — SIGNIFICANT CHANGE UP (ref 3.8–10.5)
WBC # FLD AUTO: 8.07 K/UL — SIGNIFICANT CHANGE UP (ref 3.8–10.5)
WBC # FLD AUTO: 8.07 K/UL — SIGNIFICANT CHANGE UP (ref 3.8–10.5)

## 2023-11-16 RX ORDER — CITRIC ACID/SODIUM CITRATE 300-500 MG
15 SOLUTION, ORAL ORAL EVERY 6 HOURS
Refills: 0 | Status: DISCONTINUED | OUTPATIENT
Start: 2023-11-16 | End: 2023-11-17

## 2023-11-16 RX ORDER — INFLUENZA VIRUS VACCINE 15; 15; 15; 15 UG/.5ML; UG/.5ML; UG/.5ML; UG/.5ML
0.5 SUSPENSION INTRAMUSCULAR ONCE
Refills: 0 | Status: DISCONTINUED | OUTPATIENT
Start: 2023-11-16 | End: 2023-11-18

## 2023-11-16 RX ORDER — CHLORHEXIDINE GLUCONATE 213 G/1000ML
1 SOLUTION TOPICAL DAILY
Refills: 0 | Status: DISCONTINUED | OUTPATIENT
Start: 2023-11-16 | End: 2023-11-17

## 2023-11-16 RX ORDER — SODIUM CHLORIDE 9 MG/ML
1000 INJECTION, SOLUTION INTRAVENOUS
Refills: 0 | Status: DISCONTINUED | OUTPATIENT
Start: 2023-11-16 | End: 2023-11-17

## 2023-11-16 RX ORDER — OXYTOCIN 10 UNIT/ML
333.33 VIAL (ML) INJECTION
Qty: 20 | Refills: 0 | Status: DISCONTINUED | OUTPATIENT
Start: 2023-11-16 | End: 2023-11-18

## 2023-11-16 RX ADMIN — CHLORHEXIDINE GLUCONATE 1 APPLICATION(S): 213 SOLUTION TOPICAL at 21:59

## 2023-11-16 RX ADMIN — SODIUM CHLORIDE 125 MILLILITER(S): 9 INJECTION, SOLUTION INTRAVENOUS at 21:59

## 2023-11-16 NOTE — OB RN PATIENT PROFILE - FALL HARM RISK - UNIVERSAL INTERVENTIONS
Bed in lowest position, wheels locked, appropriate side rails in place/Call bell, personal items and telephone in reach/Instruct patient to call for assistance before getting out of bed or chair/Non-slip footwear when patient is out of bed/[place] to call system/Physically safe environment - no spills, clutter or unnecessary equipment/Purposeful Proactive Rounding/Room/bathroom lighting operational, light cord in reach

## 2023-11-16 NOTE — OB RN PATIENT PROFILE - NS_OBGYNHISTORY_OBGYN_ALL_OB_FT
FT 2009 female 5#6   FT 2016 female 5#5  TOP x3, D&C x2 - pt states one TOP was in 2014 - pt unsure what years the other two were, but they were before  as per pt  FT  female 5#6   FT 2016 male 5#5  TOP x3, D&C x2 - pt states one TOP was in 2014 - pt unsure what years the other two were, but they were before  as per pt

## 2023-11-16 NOTE — OB PROVIDER H&P - HISTORY OF PRESENT ILLNESS
39 yo  @ __ presents for rrIOL. +FM. -LOF. -CTXs. -VB. Pt denies any other concerns.    – PNC: Denies prenatal issues. GBS _.  EFW _g by jen.  – OBHx:   2009  – GynHx: denies  – PMH: denies  – PSH: denies  – Psych: denies   – Social: denies   – Meds: PNV   – Allergies: NKDA  – Will accept blood transfusions? Yes     37 yo  @ __ presents for rrIOL. +FM. -LOF. -CTXs. -VB. Pt denies any other concerns.    – PNC: Denies prenatal issues. GBS _.  EFW _g by jen.  – OBHx:   2009  – GynHx: denies  – PMH: denies  – PSH: denies  – Psych: denies   – Social: denies   – Meds: PNV   – Allergies: NKDA  – Will accept blood transfusions? Yes     37 yo  @ __ presents for rrIOL. +FM. -LOF. -CTXs. -VB. Pt denies any other concerns.    – PNC: Denies prenatal issues. GBS _.  EFW _g by ejn.  – OBHx:   2009  – GynHx: denies  – PMH: denies  – PSH: denies  – Psych: denies   – Social: denies   – Meds: PNV   – Allergies: NKDA  – Will accept blood transfusions? Yes     39 yo  @ 39+2 wga presents for rrIOL. +FM. -LOF. -CTXs. -VB. Pt denies any other concerns.    – PNC: Denies prenatal issues. GBS _.  EFW 3100g by jen.  – OBHx:   2009      – GynHx: denies  – PMH: denies  – PSH: denies  – Psych: denies   – Social: denies   – Meds: PNV   – Allergies: codeine (hives)  – Will accept blood transfusions? Yes     37 yo  @ 39+2 wga presents for rrIOL. +CTX. +FM. -LOF. -VB. Pt denies any other concerns.    – PNC: Denies prenatal issues. GBS neg.  EFW 3100g by jen.  Pt has sciatica due to herniated disc - had anesthesia consult on , has records with imaging on CD - says will be dependent on how pt is feeling and anesthesia attending on call  – OBHx:     FT - 5#6    FT -5#5  3TOPs, (pt does not remember years except last TOP was in )  #1 TOP - 1st tri - medication  #2 &#3 TOP - 1st tri - required d&c  – GynHx: denies  – PMH: sciatica due to herniated disc  – PSH: denies  – Psych: denies   – Social: denies   – Meds: PNV, Benadryl, Zyrtec  – Allergies: codeine (hives)  – Will accept blood transfusions? Yes     39 yo  @ 39+2 wga presents for rrIOL. +CTX. +FM. -LOF. -VB. Pt denies any other concerns.    – PNC: Denies prenatal issues. GBS neg.  EFW 3100g by jen.  Pt has sciatica due to herniated disc - had anesthesia consult on , has records with imaging on CD - says will be dependent on how pt is feeling and anesthesia attending on call  – OBHx:     FT - 5#6    FT -5#5  3TOPs, (pt does not remember years except last TOP was in )  #1 TOP - 1st tri - medication  #2 &#3 TOP - 1st tri - required d&c  – GynHx: denies  – PMH: sciatica due to herniated disc  – PSH: denies  – Psych: denies   – Social: denies   – Meds: PNV, Benadryl, Zyrtec  – Allergies: codeine (hives)  – Will accept blood transfusions? Yes     37 yo  @ 39+2 wga presents for rrIOL. +CTX. +FM. -LOF. -VB. Pt denies any other concerns.    – PNC: Denies prenatal issues. GBS neg.  EFW 3100g by jen.  Pt has sciatica due to herniated disc - had anesthesia consult on , has records with imaging on CD - says will be dependent on how pt is feeling and anesthesia attending on call  – OBHx:     FT - 5#6    FT -5#5  3TOPs, (pt does not remember years except last TOP was in )  #1 TOP - 1st tri - medication  #2 &#3 TOP - 1st tri - required d&c  – GynHx: denies  – PMH: sciatica due to herniated disc, last flare Summer 2022  – PSH: denies  – Psych: denies   – Social: denies   – Meds: PNV, Benadryl, Zyrtec  – Allergies: codeine (hives)  – Will accept blood transfusions? Yes     39 yo  @ 39+2 wga presents for rrIOL. +CTX. +FM. -LOF. -VB. Pt denies any other concerns.    – PNC: Denies prenatal issues. GBS neg.  EFW 3100g by jen.  Pt has sciatica due to herniated disc - had anesthesia consult on , has records with imaging on CD - says will be dependent on how pt is feeling and anesthesia attending on call  – OBHx:     FT - 5#6    FT -5#5  3TOPs, (pt does not remember years except last TOP was in )  #1 TOP - 1st tri - medication  #2 &#3 TOP - 1st tri - required d&c  – GynHx: denies  – PMH: sciatica due to herniated disc, last flare Summer 2022  – PSH: denies  – Psych: denies   – Social: denies   – Meds: PNV, Benadryl, Zyrtec  – Allergies: codeine (hives)  – Will accept blood transfusions? Yes

## 2023-11-16 NOTE — OB PROVIDER H&P - NSLOWPPHRISK_OBGYN_A_OB
No previous uterine incision/[place] Pregnancy/No known bleeding disorder/No history of postpartum hemorrhage/No other PPH risks indicated

## 2023-11-16 NOTE — OB PROVIDER H&P - ASSESSMENT
Assessment  – No prenatal issues. GBS _.    Plan  1. Admit to LND. Routine Labs. IVF.  2. Expectant management/IOL w/  3. Fetus: cat 1 tracing. VTX. EFW _g by sono. Continuous EFM. Sono. No concerns.  4. Prenatal issues: none  5. GBS _  6. Pain: IV pain meds/epidural PRN    Patient discussed with attending physician,  .    Moises Fairchild MD PGY1 Assessment  – No prenatal issues. GBS _.    Plan  1. Admit to LND. Routine Labs. IVF.  2. IOL with  3. Fetus: cat 1 tracing. VTX. EFW 3100g by sono. Continuous EFM. Sono. No concerns.  4. Prenatal issues: none  5. GBS _  6. Pain: IV pain meds/epidural PRN    Patient discussed with attending physician, Dr. Rekha Fairchild MD PGY1 Assessment  – No prenatal issues. GBS neg.  Sciatica - has records with imaging - anesthesia attending on call decision    Plan  1. Admit to LND. Routine Labs. IVF.  2. IOL with BC/CB  3. Fetus: cat 1 tracing. VTX. EFW 3100g by sono. Continuous EFM. Sono. No concerns.  4. Prenatal issues: none  5. GBS neg  6. Pain: IV pain meds/epidural PRN    Patient discussed with attending physician, Dr. Rekha Fairchild MD PGY1

## 2023-11-16 NOTE — OB PROVIDER H&P - NSICDXPASTSURGICALHX_GEN_ALL_CORE_FT
Pt is alert and arousable, however more lethargic. PAST SURGICAL HISTORY:  No significant past surgical history

## 2023-11-16 NOTE — OB PROVIDER H&P - NSHPPHYSICALEXAM_GEN_ALL_CORE
Objective  – Vital Signs WNL    – PE:   CV: RRR  Pulm: breathing comfortably on RA  Abd: gravid, nontender  Extr: moving all extremities with ease    – VE: //  – FHT: baseline 1, mod variability, +accels, -decels  – [place]: qmin  – EFW: _g by sono  – Sono: vertex Objective  – Vital Signs WNL    – PE:   CV: RRR  Pulm: breathing comfortably on RA  Abd: gravid, nontender  Extr: moving all extremities with ease    – VE: //  – FHT: baseline 1, mod variability, +accels, -decels  – [place]: qmin  – EFW: _g by sono  – Sono: vertex Objective  – Vital Signs WNL    – PE:   CV: RRR  Pulm: breathing comfortably on RA  Abd: gravid, nontender  Extr: moving all extremities with ease    – VE: //  – FHT: baseline 1, mod variability, +accels, -decels  – [place]: qmin  – EFW: _g by sono  – Sono: vertex Objective  – Vital Signs WNL    – PE:   CV: RRR  Pulm: breathing comfortably on RA  Abd: gravid, nontender  Extr: moving all extremities with ease    – VE: 1/50/03  – FHT: baseline 140 bpm, mod variability, +accels, -decels  – [place]: ctx irregular  – EFW: 3100g by sono  – Sono: vertex Objective  – Vital Signs WNL    – PE:   CV: RRR  Pulm: breathing comfortably on RA  Abd: gravid, nontender  Extr: moving all extremities with ease    – VE: 1/50/03  – FHT: baseline 140 bpm, mod variability, +accels, -decels  – [place]: ctx irregular  – EFW: 3100g by sono  – Sono: vertex Objective  – Vital Signs WNL    – PE:   CV: RRR  Pulm: breathing comfortably on RA  Abd: gravid, nontender  Extr: moving all extremities with ease    – VE: 1/50/03  – FHT: baseline 140 bpm, mod variability, +accels, -decels  – [place]: ctx irregular  – EFW: 3100g by sono  – Sono: vertex

## 2023-11-16 NOTE — OB RN PATIENT PROFILE - NS PRO TDAP RECEIVED Y/N
Patient:   YAAKOV FERNANDEZ            MRN: [id number]            FIN: [id number]              Age:   85 years     Sex:  FEMALE     :  38   Associated Diagnoses:   None   Author:   MALINI KABA     Subjective   Additional information   patient says she is anxious about all her medical problems  asking for a list of medications  wants to speak to a   no gi c/o.       Health Status   Allergies:    Allergies (1) Active Reaction  NKA None Documented    Current medications:    Medications (12) Active  Scheduled: (7)  AmLODIPine 2.5 mg tab  2.5 mg 1 tab, Oral, Daily  ApixaBAN 5 mg tab  5 mg 1 tab, Oral, Q12H  HydrALAZINE 50 mg tab  50 mg 1 tab, Oral, BID  Melatonin 3 mg tab  3 mg 1 tab, Oral, Q Bedtime  Metoprolol tartrate 25 mg tab  25 mg 1 tab, Oral, Q12H  Pantoprazole 40 mg DR tab  40 mg 1 tab, Oral, BID  Polyethylene glycol 3350 oral recon powder 17 gm packet UD  17 gm 1 packet, Oral, Daily  Continuous: (0)  PRN: (5)  Acetaminophen 325 mg tab  650 mg 2 tab, Oral, Q6H  Hydrocodone-acetaminophen 5-325 mg tab  1 tab, Oral, Q4H  HydrOXYzine HCl 25 mg tab  25 mg 1 tab, Oral, Q6H (variable)  Ondansetron 4 mg/2 mL inj SDV  4 mg 2 mL, Slow IV Push, Q8H  Oxybutynin 5 mg tab  5 mg 1 tab, Oral, BID      Review of Systems   Constitutional:  Negative except as documented in history of present illness.   Respiratory:  Negative except as documented in history of present illness.   Genitourinary:  Negative except as documented in history of present illness.   Gastrointestinal:  Negative except as documented in history of present illness.   Musculoskeletal:  Negative except as documented in history of present illness.   Integumentary:  Negative except as documented in history of present illness.      Objective   VS/Measurements     Vitals between:   2020 11:22:04   TO   2020 11:22:04                   LAST RESULT MINIMUM MAXIMUM  Temperature 36.6 36.4 36.6  Heart Rate 74 74 95  Respiratory Rate 14  14 18  NISBP           155 100 161  NIDBP           81 66 93  SpO2                    98 97 99    General:  Alert and oriented, No acute distress.    Respiratory:  Lungs are clear to auscultation, Respirations are non-labored, Breath sounds are equal.   Cardiovascular:  Normal rate, Regular rhythm, No murmur.    Gastrointestinal:  Soft, Non-tender, Non-distended, Normal bowel sounds, No organomegaly, No masses.   Integumentary:  Warm, Dry, Intact.    Neurologic:  Alert, Oriented.    Psychiatric:  Cooperative, Appropriate mood & affect.      Results Review   General results   Interpretation:   Labs between:  06-JAN-2020 11:22 to 07-JAN-2020 11:22  CBC:                 WBC  HgB  Hct  Plt  MCV  RDW   07-JAN-2020   (L) 7.3  (L) 24.3         06-JAN-2020   (L) 7.7  (L) 26.3         06-JAN-2020   (L) 8.2  (L) 27.3         06-JAN-2020   (L) 8.0  (L) 27.1                                Impression and Plan   Assessment and Plan:       Diagnosis:   84 yo female with h/o afib on eliquis, COPD, breast ca with lumpectomy, chemo, xrt years ago presenting to ED with worsening SOB with exertion admitted with COPD exacerbation, elevated troponin, acute anemia with melena.  - patient a/w anemia and melena  - egd - byron lesions, hh, gastritis  - diet as tolerated  - will f/u on biopsies  - hgb 7.3 this morning - continue to monitor   - continue bid ppi for 12 weeks  - repeat egd in 8-12 weeks  - nsaid cessation  - ok for dc from gi standpoint with egd in 8-12 weeks  - thank you for the consult, call as needed please  *.  GI Attending Note:  I have discussed the patient with the GI NP and agree with recommendations as detailed in her note.  Lacy Arias MD  Gastroenterology, [facility]   no...

## 2023-11-16 NOTE — OB PROVIDER H&P - NS_OBGYNHISTORY_OBGYN_ALL_OB_FT
FT 2009 female 5#6   FT 2016 female 5#5  TOP x3, D&C x2 - pt states one TOP was in 2014 - pt unsure what years the other two were, but they were before  as per pt

## 2023-11-17 LAB
T PALLIDUM AB TITR SER: NEGATIVE — SIGNIFICANT CHANGE UP
T PALLIDUM AB TITR SER: NEGATIVE — SIGNIFICANT CHANGE UP

## 2023-11-17 RX ORDER — OXYCODONE HYDROCHLORIDE 5 MG/1
5 TABLET ORAL
Refills: 0 | Status: DISCONTINUED | OUTPATIENT
Start: 2023-11-17 | End: 2023-11-18

## 2023-11-17 RX ORDER — LANOLIN
1 OINTMENT (GRAM) TOPICAL EVERY 6 HOURS
Refills: 0 | Status: DISCONTINUED | OUTPATIENT
Start: 2023-11-17 | End: 2023-11-18

## 2023-11-17 RX ORDER — MAGNESIUM HYDROXIDE 400 MG/1
30 TABLET, CHEWABLE ORAL
Refills: 0 | Status: DISCONTINUED | OUTPATIENT
Start: 2023-11-17 | End: 2023-11-18

## 2023-11-17 RX ORDER — SIMETHICONE 80 MG/1
80 TABLET, CHEWABLE ORAL EVERY 4 HOURS
Refills: 0 | Status: DISCONTINUED | OUTPATIENT
Start: 2023-11-17 | End: 2023-11-18

## 2023-11-17 RX ORDER — HYDROCORTISONE 1 %
1 OINTMENT (GRAM) TOPICAL EVERY 6 HOURS
Refills: 0 | Status: DISCONTINUED | OUTPATIENT
Start: 2023-11-17 | End: 2023-11-18

## 2023-11-17 RX ORDER — BENZOCAINE 10 %
1 GEL (GRAM) MUCOUS MEMBRANE EVERY 6 HOURS
Refills: 0 | Status: DISCONTINUED | OUTPATIENT
Start: 2023-11-17 | End: 2023-11-18

## 2023-11-17 RX ORDER — AER TRAVELER 0.5 G/1
1 SOLUTION RECTAL; TOPICAL EVERY 4 HOURS
Refills: 0 | Status: DISCONTINUED | OUTPATIENT
Start: 2023-11-17 | End: 2023-11-18

## 2023-11-17 RX ORDER — OXYTOCIN 10 UNIT/ML
41.67 VIAL (ML) INJECTION
Qty: 20 | Refills: 0 | Status: DISCONTINUED | OUTPATIENT
Start: 2023-11-17 | End: 2023-11-18

## 2023-11-17 RX ORDER — FENTANYL CITRATE 50 UG/ML
20 INJECTION INTRAVENOUS
Refills: 0 | Status: DISCONTINUED | OUTPATIENT
Start: 2023-11-17 | End: 2023-11-17

## 2023-11-17 RX ORDER — DIPHENHYDRAMINE HCL 50 MG
25 CAPSULE ORAL EVERY 6 HOURS
Refills: 0 | Status: DISCONTINUED | OUTPATIENT
Start: 2023-11-17 | End: 2023-11-18

## 2023-11-17 RX ORDER — DIPHENHYDRAMINE HCL 50 MG
0 CAPSULE ORAL
Refills: 0 | DISCHARGE

## 2023-11-17 RX ORDER — ONDANSETRON 8 MG/1
4 TABLET, FILM COATED ORAL EVERY 6 HOURS
Refills: 0 | Status: DISCONTINUED | OUTPATIENT
Start: 2023-11-17 | End: 2023-11-18

## 2023-11-17 RX ORDER — NALOXONE HYDROCHLORIDE 4 MG/.1ML
0.1 SPRAY NASAL
Refills: 0 | Status: DISCONTINUED | OUTPATIENT
Start: 2023-11-17 | End: 2023-11-18

## 2023-11-17 RX ORDER — ACETAMINOPHEN 500 MG
975 TABLET ORAL
Refills: 0 | Status: DISCONTINUED | OUTPATIENT
Start: 2023-11-17 | End: 2023-11-18

## 2023-11-17 RX ORDER — FENTANYL CITRATE 50 UG/ML
30 INJECTION INTRAVENOUS
Refills: 0 | Status: DISCONTINUED | OUTPATIENT
Start: 2023-11-17 | End: 2023-11-17

## 2023-11-17 RX ORDER — TETANUS TOXOID, REDUCED DIPHTHERIA TOXOID AND ACELLULAR PERTUSSIS VACCINE, ADSORBED 5; 2.5; 8; 8; 2.5 [IU]/.5ML; [IU]/.5ML; UG/.5ML; UG/.5ML; UG/.5ML
0.5 SUSPENSION INTRAMUSCULAR ONCE
Refills: 0 | Status: DISCONTINUED | OUTPATIENT
Start: 2023-11-17 | End: 2023-11-18

## 2023-11-17 RX ORDER — KETOROLAC TROMETHAMINE 30 MG/ML
30 SYRINGE (ML) INJECTION ONCE
Refills: 0 | Status: DISCONTINUED | OUTPATIENT
Start: 2023-11-17 | End: 2023-11-17

## 2023-11-17 RX ORDER — SODIUM CHLORIDE 9 MG/ML
3 INJECTION INTRAMUSCULAR; INTRAVENOUS; SUBCUTANEOUS EVERY 8 HOURS
Refills: 0 | Status: DISCONTINUED | OUTPATIENT
Start: 2023-11-17 | End: 2023-11-18

## 2023-11-17 RX ORDER — OXYCODONE HYDROCHLORIDE 5 MG/1
5 TABLET ORAL ONCE
Refills: 0 | Status: DISCONTINUED | OUTPATIENT
Start: 2023-11-17 | End: 2023-11-18

## 2023-11-17 RX ORDER — PRAMOXINE HYDROCHLORIDE 150 MG/15G
1 AEROSOL, FOAM RECTAL EVERY 4 HOURS
Refills: 0 | Status: DISCONTINUED | OUTPATIENT
Start: 2023-11-17 | End: 2023-11-18

## 2023-11-17 RX ORDER — IBUPROFEN 200 MG
600 TABLET ORAL EVERY 6 HOURS
Refills: 0 | Status: COMPLETED | OUTPATIENT
Start: 2023-11-17 | End: 2024-10-15

## 2023-11-17 RX ORDER — IBUPROFEN 200 MG
600 TABLET ORAL EVERY 6 HOURS
Refills: 0 | Status: DISCONTINUED | OUTPATIENT
Start: 2023-11-17 | End: 2023-11-18

## 2023-11-17 RX ORDER — OXYTOCIN 10 UNIT/ML
VIAL (ML) INJECTION
Qty: 30 | Refills: 0 | Status: DISCONTINUED | OUTPATIENT
Start: 2023-11-17 | End: 2023-11-17

## 2023-11-17 RX ORDER — CETIRIZINE HYDROCHLORIDE 10 MG/1
0 TABLET ORAL
Refills: 0 | DISCHARGE

## 2023-11-17 RX ORDER — DIBUCAINE 1 %
1 OINTMENT (GRAM) RECTAL EVERY 6 HOURS
Refills: 0 | Status: DISCONTINUED | OUTPATIENT
Start: 2023-11-17 | End: 2023-11-18

## 2023-11-17 RX ADMIN — Medication 600 MILLIGRAM(S): at 12:55

## 2023-11-17 RX ADMIN — FENTANYL CITRATE 30 MILLILITER(S): 50 INJECTION INTRAVENOUS at 01:43

## 2023-11-17 RX ADMIN — Medication 600 MILLIGRAM(S): at 20:10

## 2023-11-17 RX ADMIN — Medication 30 MILLIGRAM(S): at 04:40

## 2023-11-17 RX ADMIN — Medication 125 MILLIUNIT(S)/MIN: at 05:34

## 2023-11-17 RX ADMIN — Medication 975 MILLIGRAM(S): at 22:13

## 2023-11-17 RX ADMIN — Medication 975 MILLIGRAM(S): at 06:31

## 2023-11-17 RX ADMIN — Medication 30 MILLIGRAM(S): at 05:00

## 2023-11-17 RX ADMIN — SODIUM CHLORIDE 3 MILLILITER(S): 9 INJECTION INTRAMUSCULAR; INTRAVENOUS; SUBCUTANEOUS at 05:48

## 2023-11-17 RX ADMIN — SODIUM CHLORIDE 3 MILLILITER(S): 9 INJECTION INTRAMUSCULAR; INTRAVENOUS; SUBCUTANEOUS at 12:00

## 2023-11-17 RX ADMIN — Medication 600 MILLIGRAM(S): at 13:30

## 2023-11-17 RX ADMIN — Medication 975 MILLIGRAM(S): at 16:35

## 2023-11-17 RX ADMIN — Medication 1 TABLET(S): at 12:55

## 2023-11-17 RX ADMIN — Medication 600 MILLIGRAM(S): at 19:40

## 2023-11-17 RX ADMIN — SODIUM CHLORIDE 3 MILLILITER(S): 9 INJECTION INTRAMUSCULAR; INTRAVENOUS; SUBCUTANEOUS at 22:18

## 2023-11-17 RX ADMIN — Medication 975 MILLIGRAM(S): at 15:15

## 2023-11-17 RX ADMIN — Medication 975 MILLIGRAM(S): at 06:57

## 2023-11-17 RX ADMIN — Medication 975 MILLIGRAM(S): at 21:43

## 2023-11-17 NOTE — OB PROVIDER LABOR PROGRESS NOTE - ASSESSMENT
@39.2wks rrIOL  Cooks cervical balloon placed without incident  60cc's instilled in both uterine and vaginal balloons  Patient tolerated well  IV pain meds/epidural PRN  Cont EFM/TOCO  Cont cytotec  Will reassess PRN    dw MD Rekha Solis NP
A/P 38y P2 @ 39/3 wks IOL  -IOL: 4Pit   -AROM clear   -IUPC placed   -Analgesia : PCA pump   -Anticipate     d/w Dr. Rekha Balbuena PGY-4  
cat I, continue IOL with pit    PLAN:  Continuous FHT/[place]  Maternal/fetal status reassuring  Will continue to reassess prn.    Moises Fairchild PGY1
cat II, s/p CB, will start pit when cat I    PLAN:  Continuous FHT/[place]  Maternal/fetal status reassuring  Will continue to reassess prn.    D/w Dr. Rekha Fairchild PGY1

## 2023-11-17 NOTE — OB PROVIDER DELIVERY SUMMARY - NSSELHIDDEN_OBGYN_ALL_OB_FT
[NS_DeliveryAttending1_OBGYN_ALL_OB_FT:[id number]],[NS_DeliveryAssist1_OBGYN_ALL_OB_FT:[id number]=]

## 2023-11-17 NOTE — OB PROVIDER LABOR PROGRESS NOTE - NS_SUBJECTIVE/OBJECTIVE_OBGYN_ALL_OB_FT
11-17-23 @ 01:54  Patient was evaluated at bedside.  Her cervix was checked and found to be 4/50/-3  CB fallen out
R4 Labor Note    S: Patient evaluated at bedside for cervical change.     O:  T(C): 36.8 (11-17-23 @ 03:00), Max: 37 (11-16-23 @ 20:10)  HR: 91 (11-17-23 @ 03:11) (75 - 102)  BP: 95/54 (11-17-23 @ 02:42) (95/54 - 135/73)  RR: 16 (11-17-23 @ 03:00) (16 - 16)  SpO2: 100% (11-17-23 @ 03:11) (94% - 100%)
11-17-23 @ 03:22  Patient was evaluated at bedside.  Her cervix was checked and found to be 4.5/70/-3
Patient seen and examined for placement of cooks cervical balloon.   VS  T(C): 37.0 (11-16-23 @ 20:38)  HR: 87 (11-16-23 @ 20:38)  BP: 119/75 (11-16-23 @ 20:38)  RR: 16 (11-16-23 @ 20:38)  SpO2: --

## 2023-11-17 NOTE — OB RN DELIVERY SUMMARY - NS_SEPSISRSKCALC_OBGYN_ALL_OB_FT
EOS calculated successfully. EOS Risk Factor: 0.5/1000 live births ([facility] national incidence); GA=39w3d; Temp=98.6; ROM=1.533; GBS='Negative'; Antibiotics='No antibiotics or any antibiotics < 2 hrs prior to birth'

## 2023-11-17 NOTE — OB RN DELIVERY SUMMARY - BABY A: APGAR 1 MIN SCORE, DELIVERY
Discharge instructions reviewed with pt. Pt verbalized understanding. Dressing to left chest incision dry and intact. No redness or drainage. 8

## 2023-11-17 NOTE — OB NEONATOLOGY/PEDIATRICIAN DELIVERY SUMMARY - NSPEDSNEONOTESA_OBGYN_ALL_OB_FT
Baby is a 39.3 wk AGA female born to a 37 y/o  mother via . PEDS called to delivery for category 2 tracing. Maternal history of sciatica. Prenatal history uncomplicated. Maternal blood type O+. PNL negative, non-reactive, and immune. GBS negative on . AROM at 02:52 on , clear fluids. Baby born vigorous and crying spontaneously. Warmed, dried, suctioned, stimulated. Apgars 8/9. EOS 0.06. Mom plans to pump and bottlefeed and undecided hepB.   BW: 3010g  : 23  TOB: 04:24  ADOD: 23

## 2023-11-17 NOTE — OB RN DELIVERY SUMMARY - NSSELHIDDEN_OBGYN_ALL_OB_FT
[NS_DeliveryAttending1_OBGYN_ALL_OB_FT:[id number]],[NS_DeliveryAssist1_OBGYN_ALL_OB_FT:[id number]],[NS_DeliveryAssist2_OBGYN_ALL_OB_FT:[id number]=],[NS_DeliveryRN_OBGYN_ALL_OB_FT:[id number]=]

## 2023-11-17 NOTE — DISCHARGE NOTE OB - CARE PROVIDER_API CALL
Gayle Da Silva  Obstetrics and Gynecology  [address] [place] [id number]  Phone: [phone]  Fax: [phone]  Established Patient  Follow Up Time: 1 month

## 2023-11-17 NOTE — OB PROVIDER DELIVERY SUMMARY - NSPROVIDERDELIVERYNOTE_OBGYN_ALL_OB_FT
Spontaneous vaginal delivery of liveborn infant from JUAN CARLOS position. Head, shoulders, and body delivered easily. Nuchalx2 easily reduced. Infant was suctioned. No mec. Cord clamped and cut and baby passed to pediatrics team. Placenta delivered intact with a 3 vessel cord. Fundal massage was given and uterine fundus was found to be firm. Vaginal exam revealed an intact cervix, vaginal walls and sulci. Patient had intact perineum. Excellent hemostasis was noted. Patient was stable and went to recovery. Count was correct x 2.    Dr. Da Silva present at delivery  Moises Fairchild PGY1 Spontaneous vaginal delivery of liveborn infant from JUAN CARLOS position. Head, shoulders, and body delivered easily. Nuchalx2 easily reduced. Infant was suctioned. No mec. Cord clamped and cut and baby passed to pediatrics team. Apgars 8/9. Weight 1dxs48gs. Placenta delivered intact with a 3 vessel cord. Fundal massage was given and uterine fundus was found to be firm. Vaginal exam revealed an intact cervix, vaginal walls and sulci. Patient had intact perineum. Excellent hemostasis was noted. Patient was stable and went to recovery. Count was correct x 2.    Dr. Da Silva present at delivery  Moises Fairchild PGY1

## 2023-11-17 NOTE — DISCHARGE NOTE OB - PATIENT PORTAL LINK FT
You can access the FollowMyHealth Patient Portal offered by [facility] by registering at the following website: [email or web address]. By joining [facility]’s FollowMyHealth portal, you will also be able to view your health information using other applications (apps) compatible with our system.

## 2023-11-17 NOTE — OB PROVIDER DELIVERY SUMMARY - NSPOSITIONATDELIA_OBGYN_ALL_OB
Interval History: The patient has no complaints this am.    Review of Systems   Musculoskeletal: Positive for back pain.     Objective:     Vital Signs (Most Recent):  Temp: 96.7 °F (35.9 °C) (02/28/18 0808)  Pulse: (!) 56 (02/28/18 0808)  Resp: 18 (02/28/18 0808)  BP: (!) 126/58 (02/28/18 0808)  SpO2: 99 % (02/28/18 0808) Vital Signs (24h Range):  Temp:  [96.7 °F (35.9 °C)-98.2 °F (36.8 °C)] 96.7 °F (35.9 °C)  Pulse:  [56-61] 56  Resp:  [16-18] 18  SpO2:  [95 %-99 %] 99 %  BP: (110-126)/(51-80) 126/58     Weight: 111.5 kg (245 lb 13 oz)  Body mass index is 43.54 kg/m².    Intake/Output Summary (Last 24 hours) at 02/28/18 1336  Last data filed at 02/28/18 0600   Gross per 24 hour   Intake              310 ml   Output                0 ml   Net              310 ml      Physical Exam   Constitutional: She is oriented to person, place, and time. She appears well-developed and well-nourished.   HENT:   Head: Normocephalic.   Eyes: Conjunctivae are normal. Right eye exhibits no discharge. Left eye exhibits no discharge.   Neck: Normal range of motion. Neck supple.   Cardiovascular: Regular rhythm and normal heart sounds.  Bradycardia present.    Pulmonary/Chest: Effort normal and breath sounds normal. No respiratory distress.   Abdominal: Soft. Bowel sounds are normal. She exhibits no distension. There is no tenderness.   Musculoskeletal: Normal range of motion.   Neurological: She is alert and oriented to person, place, and time.   Skin: Skin is warm and dry.   Psychiatric: She has a normal mood and affect. Her speech is normal and behavior is normal. Judgment and thought content normal.       Significant Labs:   CBC:   Recent Labs  Lab 02/27/18  0451 02/28/18  0543   WBC 6.74 6.60   HGB 8.5* 7.8*   HCT 27.3* 25.8*    196     CMP:   Recent Labs  Lab 02/27/18  0451 02/28/18  0543   * 133*   K 4.2 4.5   CL 99 99   CO2 26 24   GLU 82 73   BUN 8 12   CREATININE 2.9* 4.0*   CALCIUM 8.2* 8.5*   PROT 6.5 6.3    ALBUMIN 2.0* 1.9*   BILITOT 0.3 0.2   ALKPHOS 78 77   AST 26 23   ALT 28 22   ANIONGAP 9 10   EGFRNONAA 16* 11*     Magnesium:   Recent Labs  Lab 02/27/18  0451 02/28/18  0543   MG 1.9 1.7       Significant Imaging: I have reviewed and interpreted all pertinent imaging results/findings within the past 24 hours.   Left Occiput Anterior

## 2023-11-17 NOTE — OB PROVIDER LABOR PROGRESS NOTE - NS_OBIHIFHRDETAILS_OBGYN_ALL_OB_FT
135 mod caitlin/+accels/-decels
145 bpm, mod variability, +accels, -decels, cat I
145 bpm, mod variability, +accels, +1 min prolonged decel, cat II

## 2023-11-17 NOTE — DISCHARGE NOTE OB - MEDICATION SUMMARY - MEDICATIONS TO TAKE
I will START or STAY ON the medications listed below when I get home from the hospital:  None I will START or STAY ON the medications listed below when I get home from the hospital:    ibuprofen 600 mg oral tablet  -- 1 tab(s) by mouth every 6 hours as needed for  moderate pain  -- Indication: For pain    acetaminophen 325 mg oral tablet  -- 3 tab(s) by mouth every 6 hours as needed for  moderate pain  -- Indication: For pain    dibucaine 1% topical ointment  -- 1 Apply on skin to affected area every 6 hours As needed Perineal discomfort  -- Indication: For vaginal pain    witch hazel 50% topical pad  -- 1 Apply on skin to affected area every 4 hours As needed Perineal discomfort  -- Indication: For vaginal pain

## 2023-11-18 VITALS
TEMPERATURE: 98 F | RESPIRATION RATE: 18 BRPM | HEART RATE: 90 BPM | OXYGEN SATURATION: 98 % | DIASTOLIC BLOOD PRESSURE: 60 MMHG | SYSTOLIC BLOOD PRESSURE: 112 MMHG

## 2023-11-18 RX ORDER — ACETAMINOPHEN 500 MG
3 TABLET ORAL
Qty: 0 | Refills: 0 | DISCHARGE
Start: 2023-11-18

## 2023-11-18 RX ORDER — DIBUCAINE 1 %
1 OINTMENT (GRAM) RECTAL
Qty: 0 | Refills: 0 | DISCHARGE
Start: 2023-11-18

## 2023-11-18 RX ORDER — IBUPROFEN 200 MG
1 TABLET ORAL
Qty: 0 | Refills: 0 | DISCHARGE
Start: 2023-11-18

## 2023-11-18 RX ORDER — AER TRAVELER 0.5 G/1
1 SOLUTION RECTAL; TOPICAL
Qty: 0 | Refills: 0 | DISCHARGE
Start: 2023-11-18

## 2023-11-18 RX ADMIN — Medication 600 MILLIGRAM(S): at 06:03

## 2023-11-18 RX ADMIN — Medication 600 MILLIGRAM(S): at 01:19

## 2023-11-18 RX ADMIN — Medication 975 MILLIGRAM(S): at 10:30

## 2023-11-18 RX ADMIN — Medication 600 MILLIGRAM(S): at 12:23

## 2023-11-18 RX ADMIN — SODIUM CHLORIDE 3 MILLILITER(S): 9 INJECTION INTRAMUSCULAR; INTRAVENOUS; SUBCUTANEOUS at 06:02

## 2023-11-18 RX ADMIN — Medication 975 MILLIGRAM(S): at 03:53

## 2023-11-18 RX ADMIN — Medication 975 MILLIGRAM(S): at 15:46

## 2023-11-18 RX ADMIN — Medication 600 MILLIGRAM(S): at 06:33

## 2023-11-18 RX ADMIN — Medication 975 MILLIGRAM(S): at 04:23

## 2023-11-18 RX ADMIN — Medication 600 MILLIGRAM(S): at 13:00

## 2023-11-18 RX ADMIN — Medication 975 MILLIGRAM(S): at 16:30

## 2023-11-18 RX ADMIN — Medication 975 MILLIGRAM(S): at 09:58

## 2023-11-18 RX ADMIN — Medication 1 TABLET(S): at 12:23

## 2023-11-18 RX ADMIN — Medication 600 MILLIGRAM(S): at 00:49

## 2023-11-18 NOTE — PROGRESS NOTE ADULT - SUBJECTIVE AND OBJECTIVE BOX
Post-partum Note,   She is a  38y woman who is now post-partum day: 1    Subjective:  The patient feels well.  She is ambulating.   She is tolerating regular diet.  She denies nausea and vomiting; denies fever.  She is voiding.  Her pain is controlled.  She reports normal postpartum bleeding.       Physical exam:    Vital Signs Last 24 Hrs  T(C): 36.7 (2023 05:48), Max: 37 (2023 14:05)  T(F): 98.1 (2023 05:48), Max: 98.6 (2023 14:05)  HR: 84 (2023 05:48) (82 - 89)  BP: 103/53 (2023 05:48) (103/53 - 130/72)  BP(mean): --  RR: 18 (2023 05:48) (18 - 18)  SpO2: 99% (2023 05:48) (99% - 100%)    Parameters below as of 2023 05:48  Patient On (Oxygen Delivery Method): room air        Gen: NAD  Breast: Soft, nontender, not engorged.  Abdomen: Soft, nontender, no distension , firm uterine fundus at umbilicus.  Pelvic: Normal lochia noted  Ext: No calf tenderness    LABS:                        10.3   8.07  )-----------( 290      ( 2023 20:30 )             31.9       Rubella status:     Allergies    codeine (Hives)    Intolerances      MEDICATIONS  (STANDING):  acetaminophen     Tablet .. 975 milliGRAM(s) Oral <User Schedule>  diphtheria/tetanus/pertussis (acellular) Vaccine (Adacel) 0.5 milliLiter(s) IntraMuscular once  ibuprofen  Tablet. 600 milliGRAM(s) Oral every 6 hours  influenza   Vaccine 0.5 milliLiter(s) IntraMuscular once  oxytocin Infusion 41.667 milliUNIT(s)/Min (125 mL/Hr) IV Continuous <Continuous>  oxytocin Infusion 333.333 milliUNIT(s)/Min (1000 mL/Hr) IV Continuous <Continuous>  prenatal multivitamin 1 Tablet(s) Oral daily  sodium chloride 0.9% lock flush 3 milliLiter(s) IV Push every 8 hours    MEDICATIONS  (PRN):  benzocaine 20%/menthol 0.5% Spray 1 Spray(s) Topical every 6 hours PRN for Perineal discomfort  dibucaine 1% Ointment 1 Application(s) Topical every 6 hours PRN Perineal discomfort  diphenhydrAMINE 25 milliGRAM(s) Oral every 6 hours PRN Pruritus  hydrocortisone 1% Cream 1 Application(s) Topical every 6 hours PRN Moderate Pain (4-6)  lanolin Ointment 1 Application(s) Topical every 6 hours PRN nipple soreness  magnesium hydroxide Suspension 30 milliLiter(s) Oral two times a day PRN Constipation  naloxone Injectable 0.1 milliGRAM(s) IV Push every 3 minutes PRN For ANY of the following changes in patient status:  A. RR LESS THAN 10 breaths per minute, B. Oxygen saturation LESS THAN 90%, C. Sedation score of 6  ondansetron Injectable 4 milliGRAM(s) IV Push every 6 hours PRN Nausea  oxyCODONE    IR 5 milliGRAM(s) Oral every 3 hours PRN Moderate to Severe Pain (4-10)  oxyCODONE    IR 5 milliGRAM(s) Oral once PRN Moderate to Severe Pain (4-10)  pramoxine 1%/zinc 5% Cream 1 Application(s) Topical every 4 hours PRN Moderate Pain (4-6)  simethicone 80 milliGRAM(s) Chew every 4 hours PRN Gas  witch hazel Pads 1 Application(s) Topical every 4 hours PRN Perineal discomfort        Assessment and Plan  PPD #1 s/p   Doing well.  Encourage ambulation.  PP & PPD Instructions reviewed.  CPC.  D/C home today

## 2024-11-18 NOTE — OB PROVIDER H&P - LABOR: BISHOP SCORE
Thyroid Function Tests:  10-16 @ 06:50 TSH 1.74 FreeT4 1.2 T3 -- Anti TPO -- Anti Thyroglobulin Ab -- TSI --  C/w LT4 125mcg day  Please make sure LT4 is given on an empty stomach at least one hour apart from other meds and food to ensure med absorption AND 4 HOURS APART FROM CA/FE/MVI/PPI!!   Follow up levels as out patient. 4

## 2024-11-21 NOTE — DISCHARGE NOTE NURSING/CASE MANAGEMENT/SOCIAL WORK - NSDCPETBCESMAN_GEN_ALL_CORE
Auth [id number] for [employer] for patient    Received call from Osmin Mares stated she wanted us to know that his is approved.  From 11/20/24-11/19/2025   If you are a smoker, it is important for your health to stop smoking. Please be aware that second hand smoke is also harmful.